# Patient Record
Sex: FEMALE | Race: OTHER | HISPANIC OR LATINO
[De-identification: names, ages, dates, MRNs, and addresses within clinical notes are randomized per-mention and may not be internally consistent; named-entity substitution may affect disease eponyms.]

---

## 2017-05-04 ENCOUNTER — RESULT REVIEW (OUTPATIENT)
Age: 47
End: 2017-05-04

## 2017-09-08 ENCOUNTER — MEDICATION RENEWAL (OUTPATIENT)
Age: 47
End: 2017-09-08

## 2017-09-12 ENCOUNTER — MEDICATION RENEWAL (OUTPATIENT)
Age: 47
End: 2017-09-12

## 2017-09-21 ENCOUNTER — MEDICATION RENEWAL (OUTPATIENT)
Age: 47
End: 2017-09-21

## 2018-08-01 ENCOUNTER — CLINICAL ADVICE (OUTPATIENT)
Age: 48
End: 2018-08-01

## 2018-08-09 ENCOUNTER — APPOINTMENT (OUTPATIENT)
Dept: ENDOCRINOLOGY | Facility: CLINIC | Age: 48
End: 2018-08-09
Payer: COMMERCIAL

## 2018-08-09 VITALS
OXYGEN SATURATION: 100 % | BODY MASS INDEX: 20.31 KG/M2 | HEART RATE: 84 BPM | TEMPERATURE: 98 F | HEIGHT: 68 IN | DIASTOLIC BLOOD PRESSURE: 94 MMHG | SYSTOLIC BLOOD PRESSURE: 164 MMHG | WEIGHT: 134 LBS

## 2018-08-09 DIAGNOSIS — Z82.49 FAMILY HISTORY OF ISCHEMIC HEART DISEASE AND OTHER DISEASES OF THE CIRCULATORY SYSTEM: ICD-10-CM

## 2018-08-09 DIAGNOSIS — Z92.89 PERSONAL HISTORY OF OTHER MEDICAL TREATMENT: ICD-10-CM

## 2018-08-09 DIAGNOSIS — Z83.49 FAMILY HISTORY OF OTHER ENDOCRINE, NUTRITIONAL AND METABOLIC DISEASES: ICD-10-CM

## 2018-08-09 DIAGNOSIS — I73.00 RAYNAUD'S SYNDROME W/OUT GANGRENE: ICD-10-CM

## 2018-08-09 PROCEDURE — 99214 OFFICE O/P EST MOD 30 MIN: CPT

## 2018-08-09 RX ORDER — LEVOTHYROXINE SODIUM 175 UG/1
175 TABLET ORAL
Qty: 80 | Refills: 3 | Status: DISCONTINUED | COMMUNITY
Start: 2017-09-08 | End: 2018-08-09

## 2018-08-11 PROBLEM — Z92.89 HISTORY OF PLASMAPHERESIS: Status: RESOLVED | Noted: 2018-08-11 | Resolved: 2018-08-11

## 2018-08-11 PROBLEM — I73.00 RAYNAUD'S SYNDROME: Status: ACTIVE | Noted: 2018-08-11

## 2018-08-11 PROBLEM — Z82.49 FAMILY HISTORY OF HYPERTENSION: Status: ACTIVE | Noted: 2018-08-11

## 2018-09-25 ENCOUNTER — EMERGENCY (EMERGENCY)
Facility: HOSPITAL | Age: 48
LOS: 1 days | Discharge: HOME | End: 2018-09-25
Attending: EMERGENCY MEDICINE | Admitting: EMERGENCY MEDICINE
Payer: COMMERCIAL

## 2018-09-25 VITALS
HEART RATE: 60 BPM | SYSTOLIC BLOOD PRESSURE: 159 MMHG | TEMPERATURE: 98 F | DIASTOLIC BLOOD PRESSURE: 90 MMHG | OXYGEN SATURATION: 100 % | RESPIRATION RATE: 18 BRPM

## 2018-09-25 VITALS
WEIGHT: 130.07 LBS | HEIGHT: 68 IN | OXYGEN SATURATION: 100 % | TEMPERATURE: 98 F | SYSTOLIC BLOOD PRESSURE: 162 MMHG | DIASTOLIC BLOOD PRESSURE: 101 MMHG | HEART RATE: 75 BPM | RESPIRATION RATE: 16 BRPM

## 2018-09-25 LAB
ALBUMIN SERPL ELPH-MCNC: 3.6 G/DL — SIGNIFICANT CHANGE UP (ref 3.3–5)
ALP SERPL-CCNC: 24 U/L — LOW (ref 40–120)
ALT FLD-CCNC: 18 U/L DA — SIGNIFICANT CHANGE UP (ref 10–45)
ANION GAP SERPL CALC-SCNC: 7 MMOL/L — SIGNIFICANT CHANGE UP (ref 5–17)
APPEARANCE UR: CLEAR — SIGNIFICANT CHANGE UP
APTT BLD: 33.3 SEC — SIGNIFICANT CHANGE UP (ref 27.5–37.4)
AST SERPL-CCNC: 26 U/L — SIGNIFICANT CHANGE UP (ref 10–40)
BASOPHILS # BLD AUTO: 0 K/UL — SIGNIFICANT CHANGE UP (ref 0–0.2)
BASOPHILS NFR BLD AUTO: 1 % — SIGNIFICANT CHANGE UP (ref 0–2)
BILIRUB SERPL-MCNC: 0.2 MG/DL — SIGNIFICANT CHANGE UP (ref 0.2–1.2)
BILIRUB UR-MCNC: NEGATIVE — SIGNIFICANT CHANGE UP
BLD GP AB SCN SERPL QL: SIGNIFICANT CHANGE UP
BUN SERPL-MCNC: 8 MG/DL — SIGNIFICANT CHANGE UP (ref 7–23)
CALCIUM SERPL-MCNC: 9.3 MG/DL — SIGNIFICANT CHANGE UP (ref 8.4–10.5)
CHLORIDE SERPL-SCNC: 104 MMOL/L — SIGNIFICANT CHANGE UP (ref 96–108)
CO2 SERPL-SCNC: 29 MMOL/L — SIGNIFICANT CHANGE UP (ref 22–31)
COLOR SPEC: YELLOW — SIGNIFICANT CHANGE UP
CREAT SERPL-MCNC: 0.82 MG/DL — SIGNIFICANT CHANGE UP (ref 0.5–1.3)
DIFF PNL FLD: NEGATIVE — SIGNIFICANT CHANGE UP
EOSINOPHIL # BLD AUTO: 0.1 K/UL — SIGNIFICANT CHANGE UP (ref 0–0.5)
EOSINOPHIL NFR BLD AUTO: 1.5 % — SIGNIFICANT CHANGE UP (ref 0–6)
GLUCOSE SERPL-MCNC: 96 MG/DL — SIGNIFICANT CHANGE UP (ref 70–99)
GLUCOSE UR QL: NEGATIVE — SIGNIFICANT CHANGE UP
HCG SERPL-ACNC: <1 MIU/ML — SIGNIFICANT CHANGE UP
HCT VFR BLD CALC: 39.7 % — SIGNIFICANT CHANGE UP (ref 34.5–45)
HGB BLD-MCNC: 12.8 G/DL — SIGNIFICANT CHANGE UP (ref 11.5–15.5)
INR BLD: 1.07 RATIO — SIGNIFICANT CHANGE UP (ref 0.88–1.16)
KETONES UR-MCNC: NEGATIVE — SIGNIFICANT CHANGE UP
LEUKOCYTE ESTERASE UR-ACNC: NEGATIVE — SIGNIFICANT CHANGE UP
LIDOCAIN IGE QN: 74 U/L — SIGNIFICANT CHANGE UP (ref 73–393)
LYMPHOCYTES # BLD AUTO: 1 K/UL — SIGNIFICANT CHANGE UP (ref 1–3.3)
LYMPHOCYTES # BLD AUTO: 23.2 % — SIGNIFICANT CHANGE UP (ref 13–44)
MCHC RBC-ENTMCNC: 27.5 PG — SIGNIFICANT CHANGE UP (ref 27–34)
MCHC RBC-ENTMCNC: 32.3 GM/DL — SIGNIFICANT CHANGE UP (ref 32–36)
MCV RBC AUTO: 85.1 FL — SIGNIFICANT CHANGE UP (ref 80–100)
MONOCYTES # BLD AUTO: 0.3 K/UL — SIGNIFICANT CHANGE UP (ref 0–0.9)
MONOCYTES NFR BLD AUTO: 7.3 % — SIGNIFICANT CHANGE UP (ref 2–14)
NEUTROPHILS # BLD AUTO: 2.8 K/UL — SIGNIFICANT CHANGE UP (ref 1.8–7.4)
NEUTROPHILS NFR BLD AUTO: 67 % — SIGNIFICANT CHANGE UP (ref 43–77)
NITRITE UR-MCNC: NEGATIVE — SIGNIFICANT CHANGE UP
PH UR: 8 — SIGNIFICANT CHANGE UP (ref 5–8)
PLATELET # BLD AUTO: 180 K/UL — SIGNIFICANT CHANGE UP (ref 150–400)
POTASSIUM SERPL-MCNC: 4 MMOL/L — SIGNIFICANT CHANGE UP (ref 3.5–5.3)
POTASSIUM SERPL-SCNC: 4 MMOL/L — SIGNIFICANT CHANGE UP (ref 3.5–5.3)
PROT SERPL-MCNC: 7.9 G/DL — SIGNIFICANT CHANGE UP (ref 6–8.3)
PROT UR-MCNC: NEGATIVE — SIGNIFICANT CHANGE UP
PROTHROM AB SERPL-ACNC: 11.9 SEC — SIGNIFICANT CHANGE UP (ref 9.8–12.7)
RBC # BLD: 4.67 M/UL — SIGNIFICANT CHANGE UP (ref 3.8–5.2)
RBC # FLD: 14.7 % — HIGH (ref 10.3–14.5)
SODIUM SERPL-SCNC: 140 MMOL/L — SIGNIFICANT CHANGE UP (ref 135–145)
SP GR SPEC: 1.01 — SIGNIFICANT CHANGE UP (ref 1.01–1.02)
UROBILINOGEN FLD QL: NEGATIVE — SIGNIFICANT CHANGE UP
WBC # BLD: 4.2 K/UL — SIGNIFICANT CHANGE UP (ref 3.8–10.5)
WBC # FLD AUTO: 4.2 K/UL — SIGNIFICANT CHANGE UP (ref 3.8–10.5)

## 2018-09-25 PROCEDURE — 84702 CHORIONIC GONADOTROPIN TEST: CPT

## 2018-09-25 PROCEDURE — 74177 CT ABD & PELVIS W/CONTRAST: CPT | Mod: 26

## 2018-09-25 PROCEDURE — 85730 THROMBOPLASTIN TIME PARTIAL: CPT

## 2018-09-25 PROCEDURE — 86901 BLOOD TYPING SEROLOGIC RH(D): CPT

## 2018-09-25 PROCEDURE — 74177 CT ABD & PELVIS W/CONTRAST: CPT

## 2018-09-25 PROCEDURE — 99284 EMERGENCY DEPT VISIT MOD MDM: CPT

## 2018-09-25 PROCEDURE — 80053 COMPREHEN METABOLIC PANEL: CPT

## 2018-09-25 PROCEDURE — 85610 PROTHROMBIN TIME: CPT

## 2018-09-25 PROCEDURE — 85027 COMPLETE CBC AUTOMATED: CPT

## 2018-09-25 PROCEDURE — 99284 EMERGENCY DEPT VISIT MOD MDM: CPT | Mod: 25

## 2018-09-25 PROCEDURE — 96374 THER/PROPH/DIAG INJ IV PUSH: CPT | Mod: XU

## 2018-09-25 PROCEDURE — 86900 BLOOD TYPING SEROLOGIC ABO: CPT

## 2018-09-25 PROCEDURE — 86850 RBC ANTIBODY SCREEN: CPT

## 2018-09-25 PROCEDURE — 83690 ASSAY OF LIPASE: CPT

## 2018-09-25 RX ORDER — ONDANSETRON 8 MG/1
4 TABLET, FILM COATED ORAL ONCE
Qty: 0 | Refills: 0 | Status: COMPLETED | OUTPATIENT
Start: 2018-09-25 | End: 2018-09-25

## 2018-09-25 RX ORDER — NITROFURANTOIN MACROCRYSTAL 50 MG
100 CAPSULE ORAL ONCE
Qty: 0 | Refills: 0 | Status: COMPLETED | OUTPATIENT
Start: 2018-09-25 | End: 2018-09-25

## 2018-09-25 RX ADMIN — ONDANSETRON 4 MILLIGRAM(S): 8 TABLET, FILM COATED ORAL at 17:44

## 2018-09-25 RX ADMIN — Medication 100 MILLIGRAM(S): at 20:17

## 2018-09-25 NOTE — ED PROVIDER NOTE - SHIFT CHANGE DETAILS
F/U CT ; Patient signed out to incoming physician.  All decisions regarding the progression of care will be made at their discretion.

## 2018-09-25 NOTE — ED PROVIDER NOTE - PHYSICAL EXAMINATION
+ RLQ tenderness,  neg psoas sign and neg obturator's sign  pelvic:  Chaperoned by BROOKS Rodriguez,   no external lesions or rash  no vaginal bleeding , + mild white mucoid discharge,  no uterine or  adnexa tenderness bilat  no cervix visualized or palpated,  pt stated she had ablation?

## 2018-09-25 NOTE — ED PROVIDER NOTE - ATTENDING CONTRIBUTION TO CARE
Hans with DIMITRIS Hinson. Patient with abd tenderness to the RLQ.  exam performed by DIMITRIS Lu. Patient without assoc findings. Likely UTI as there is dysuria as well as suprapubic tenderness. Sent by Urgent Care with the expectation to CT to r/o Appy. Pt affirms that "this feels different" from her usual UTI (the pain). I performed a face to face bedside interview with patient regarding history of present illness, review of symptoms and past medical history. I completed an independent physical exam.  I have discussed the patient's plan of care with Physician Assistant (PA). I agree with note as stated above, having amended the EMR as needed to reflect my findings.   This includes History of Present Illness, HIV, Past Medical/Surgical/Family/Social History, Allergies and Home Medications, Review of Systems, Physical Exam, and any Progress Notes during the time I functioned as the attending physician for this patient.

## 2018-09-25 NOTE — ED PROVIDER NOTE - MEDICAL DECISION MAKING DETAILS
46 yo F with  urinary symptoms suggestive of UTI, abx already prescribed by     RLQ tenderness,   d/w Dr Lakhani, will evaluated for appendicitis,   ordered for IV, urine,  GI labs,   CT scan/abd

## 2018-09-25 NOTE — ED PROVIDER NOTE - PROGRESS NOTE DETAILS
DIMITRIS Lu:  Ct scan  results d/w Dr Wilson,  no acute appendicitis,    + appendolith, risk of appendicitis, can d/c home DIMITRIS Lu: prior to d/c,  pt and pt's  informed of results and pt has no appendicitis but should f/u with pcp or her GI for further evaluation who can refer her to  general sx,  clear abd pain instructions given to pt who verbalized understanding DIMITRIS Lu: prior to d/c,  pt and pt's  informed of results and pt has no appendicitis but should f/u with pcp or her GI for further evaluation who can refer her to  general sx,  clear abd pain instructions given to pt who verbalized understanding, copy of results given to pt

## 2018-09-25 NOTE — ED PROVIDER NOTE - OBJECTIVE STATEMENT
48 yo F with cystic fibrosis,  CIDP(  chronic inflammatory demyelinating polyneuropathy  on IVIG, no HTN, presents with 1-2 weeks of urinary frequency,  no dysuria or hematuria,   + lower abd pain,  but today lower abd pain and urinary frequency worsening.   pt went to [facility] UC and  u/a: hematuria,   d/c rx with macrobid and fluconazole ( empirical tx for  candida) for UTI but  on exam,  had RLQ tenderness and advised to go to ER for evaluation of appendicitis    pt denies any h/o renal stones, pyelonephritis,  abd sx,  no vaginal bleeding, no abnormal vaginal discharge  pt c/o  feeling weak

## 2018-09-25 NOTE — ED ADULT TRIAGE NOTE - CHIEF COMPLAINT QUOTE
I have abdominal pain and am nauseous. I was at urgent care and they thought I had a kidney stone or my appendix is a  problem

## 2018-10-18 PROBLEM — E84.9 CYSTIC FIBROSIS, UNSPECIFIED: Chronic | Status: ACTIVE | Noted: 2018-09-25

## 2018-11-09 ENCOUNTER — APPOINTMENT (OUTPATIENT)
Dept: DERMATOLOGY | Facility: CLINIC | Age: 48
End: 2018-11-09
Payer: COMMERCIAL

## 2018-11-09 VITALS — BODY MASS INDEX: 20.31 KG/M2 | HEIGHT: 68 IN | WEIGHT: 134 LBS

## 2018-11-09 DIAGNOSIS — L80 VITILIGO: ICD-10-CM

## 2018-11-09 DIAGNOSIS — Z84.0 FAMILY HISTORY OF DISEASES OF THE SKIN AND SUBCUTANEOUS TISSUE: ICD-10-CM

## 2018-11-09 PROCEDURE — 99203 OFFICE O/P NEW LOW 30 MIN: CPT

## 2019-01-14 ENCOUNTER — CLINICAL ADVICE (OUTPATIENT)
Age: 49
End: 2019-01-14

## 2019-02-07 ENCOUNTER — RX RENEWAL (OUTPATIENT)
Age: 49
End: 2019-02-07

## 2019-02-26 ENCOUNTER — RESULT REVIEW (OUTPATIENT)
Age: 49
End: 2019-02-26

## 2019-04-25 ENCOUNTER — CLINICAL ADVICE (OUTPATIENT)
Age: 49
End: 2019-04-25

## 2019-04-30 ENCOUNTER — MEDICATION RENEWAL (OUTPATIENT)
Age: 49
End: 2019-04-30

## 2019-05-02 ENCOUNTER — CLINICAL ADVICE (OUTPATIENT)
Age: 49
End: 2019-05-02

## 2019-08-05 ENCOUNTER — APPOINTMENT (OUTPATIENT)
Dept: ENDOCRINOLOGY | Facility: CLINIC | Age: 49
End: 2019-08-05
Payer: COMMERCIAL

## 2019-08-05 VITALS
OXYGEN SATURATION: 100 % | HEART RATE: 81 BPM | HEIGHT: 68 IN | SYSTOLIC BLOOD PRESSURE: 147 MMHG | BODY MASS INDEX: 20.31 KG/M2 | WEIGHT: 134 LBS | DIASTOLIC BLOOD PRESSURE: 82 MMHG

## 2019-08-05 DIAGNOSIS — Z82.0 FAMILY HISTORY OF EPILEPSY AND OTHER DISEASES OF THE NERVOUS SYSTEM: ICD-10-CM

## 2019-08-05 PROCEDURE — 99214 OFFICE O/P EST MOD 30 MIN: CPT

## 2019-08-05 RX ORDER — IMMUNE GLOBULIN INFUSION (HUMAN) 100 MG/ML
20 INJECTION, SOLUTION INTRAVENOUS; SUBCUTANEOUS
Refills: 0 | Status: DISCONTINUED | COMMUNITY
Start: 2018-08-12 | End: 2019-08-05

## 2019-08-05 NOTE — DATA REVIEWED
[FreeTextEntry1] : [facility]  (7/22/19)\par \par Free T4 1.27 ng/dl  (0.82-1.77)\par TSH 1.76 uU/ml  (0.45-4.5)\par 25-D level 51.5 ng/ml\par \par Bone densitometry  (Dec, 2018--done by Gyn)\par \par T-scores:\par \par LS spine   -0.3\par L hip          -0.8\par L femoral neck  -1.0

## 2019-08-05 NOTE — REVIEW OF SYSTEMS
[Fatigue] : no fatigue [Decreased Appetite] : appetite not decreased [Recent Weight Gain (___ Lbs)] : no recent weight gain [Recent Weight Loss (___ Lbs)] : no recent weight loss [Fever] : no fever [Chills] : no chills [Blurry Vision] : no blurred vision [Dry Eyes] : no dryness of the eyes [Eyes Itch] : no itching of the eyes [Dysphagia] : no dysphagia [Hearing Loss] : no hearing loss  [Chest Pain] : no chest pain [Leg Claudication] : no intermittent leg claudication [Lower Ext Edema] : no lower extremity edema [Shortness Of Breath] : no shortness of breath [Wheezing] : no wheezing was heard [SOB on Exertion] : no shortness of breath during exertion [Nausea] : no nausea [Constipation] : no constipation [Diarrhea] : no diarrhea [Heartburn] : no heartburn [Polyuria] : no polyuria [Dysuria] : no dysuria [Muscle Cramps] : no muscle cramps [Myalgia] : no myalgia  [Hirsutism] : no hirsutism [Hair Loss] : no hair loss [Dry Skin] : no dry skin [Headache] : no headaches [Tremors] : no tremors [Difficulty Walking] : no difficulty walking [Depression] : no depression [Anxiety] : no anxiety [Insomnia] : no insomnia [Stress] : no stress [Polydipsia] : no polydipsia [Cold Intolerance] : cold tolerant [Heat Intolerance] : heat tolerant [Easy Bleeding] : no ~M tendency for easy bleeding [Easy Bruising] : no tendency for easy bruising [FreeTextEntry5] : Infrequent attacks of palpitatiions [FreeTextEntry6] : Minimal cough except after her morning chest PT [FreeTextEntry8] : Nocturia once a night [FreeTextEntry9] : Intermittent pain in the MP joint of her left first toe [de-identified] : Vitiligo continues to progress, gavino on her arms [de-identified] : See HPI re: neuropathic symptoms

## 2019-08-05 NOTE — ASSESSMENT
[FreeTextEntry1] : 1) Hypothyroidism: TSH level is in the optimal range of 0.4-2.5 uU/ml\par --Continue Synthroid 137 mcg/day\par \par 2) Vitamin D deficiency:  25-D level is excellent on her current regimen.\par --Continue 5000 units once a week\par \par 3) Hypertension:  The acute rise in her BP during IVIG infusions is presumably due to the high Na content of the solution (plus the extra IV hydration she was being given).  This problem appears to have been solved by the Lasix, and she also no longer has any ongoing edema between infusions.\par Her BP today is mildly elevated, however, and she admits that she has not been checking BPs between IVIG infusions.\par --To continue using Lasix on the day of and the day after IVIG.\par --To begin monitoring BPs daily between infusions, varying the time of day when she checks.  Also suggested that she keep a log of the readings.  Target would be < 130-140/80-85, ideally < 130/80.  She is to call me if readings are elevated.  Will have her follow a low-salt diet as the first step, but then likely add medication.  \par \par Pt to repeat labwork in November:  Fasting CMP,  A1c (DM screening), TFTs, 25-D, cortisol level (to screen for adrenal suppression given her combined nasal and inhaled steroids)\par Extra requisition for CMP, TFTs, 25-D  (approx March)\par \par See for follow-up in 1 year.  CMP, TFTs, 25-D  before the visit--but will see earlier if BP is a problem. [FreeTextEntry2] : BP targets

## 2019-08-05 NOTE — REASON FOR VISIT
[Follow-Up: _____] : a [unfilled] follow-up visit [FreeTextEntry1] : hypothyroidism and vitamin D deficiency

## 2019-08-05 NOTE — PHYSICAL EXAM
[Alert] : alert [Healthy Appearance] : healthy appearance [Well Nourished] : well nourished [PERRL] : pupils equal, round and reactive to light [Normal Sclera/Conjunctiva] : normal sclera/conjunctiva [EOMI] : extra ocular movement intact [No Lid Lag] : no lid lag [No Proptosis] : no proptosis [Normal Hearing] : hearing was normal [Normal Outer Ear/Nose] : the ears and nose were normal in appearance [No LAD] : no lymphadenopathy [No Neck Mass] : no neck mass was observed [Clear to Auscultation] : lungs were clear to auscultation bilaterally [Regular Rhythm] : with a regular rhythm [Normal Rate] : heart rate was normal  [Carotids Normal] : carotid pulses were normal with no bruits [No Edema] : there was no peripheral edema [Not Tender] : non-tender [No HSM] : no hepato-splenomegaly [Soft] : abdomen soft [Normal] : normal and non tender [No CVA Tenderness] : no ~M costovertebral angle tenderness [No Joint Swelling] : no joint swelling seen [Normal Gait] : normal gait [Normal Strength/Tone] : muscle strength and tone were normal [No Rash] : no rash [No Involuntary Movements] : no involuntary movements were seen [No Tremors] : no tremors [Normal Affect] : the affect was normal [Normal Sensation on Monofilament Testing] : normal sensation on monofilament testing of lower extremities [Normal Mood] : the mood was normal [Kyphosis] : no kyphosis present [Foot Ulcers] : no foot ulcers [Hirsutism] : no hirsutism [Acanthosis Nigricans] : no acanthosis nigricans [de-identified] : No corneal arcus or xanthelasma [de-identified] : Thyroid non-palpable [de-identified] : Soft mid-systolic murmur at the apex, audible only with pt supine [de-identified] : DP pulses 2+ ont he right, 1+ on the left [de-identified] : No cervical or supraclavicular adenopathy [de-identified] : Large patches of vitiligo on her arms and lower legs [de-identified] : Vibratory sensation mildly decreased over the toes

## 2019-08-05 NOTE — HISTORY OF PRESENT ILLNESS
[FreeTextEntry1] : 48-year-old woman who is followed for hypothyroidism and vitamin D deficiency.  Her other significant active medical problems are cystic fibrosis (diagnosed in adulthood, with primarily pulmonary involvement) and CIDP (treated previously with plasmapheresis, more recently with IVIG).  Her hypothyroidism and CIDP are accompanied by a number of other autoimmune illnesses which include vitiligo, celiac disease and Raynauds syndrome.  Her family history is positive for lupus (in her mother), vasculitis (also in her mother), hypothyroidism (in her sister and son), Graves' disease (in her brother), type I DM (in her daughter), MS (in her daughter) and idiopathic urticaria (in her son).\par \par She now returns after a hiatus of a year.  Interim history since her last visit:\par --Has continued on low-dose IVIG (now up to 25 grams every 2 weeks), and has had significant improvement in her neuropathy.  She still has burning-type neuropathic pain in her feet, but her muscle strength is nearly normal.  Her WBC counts have remained stable in the 3185-8362 range.\par --Has not had any respiratory exacerbations from her CF\par --Developed problems with edema and hypertension in April.  (Blood pressure was particularly high during IVIG infusions).  Was started on Lasix, and now takes 20 mg 1 hour before the IVIG infusion, and the day after the infusion.  Her BPs now remain normal during the infusion (though she urinates every 30 minutes), and she has no edema between infusions.  (She has not been checking BPs between infusions).\par --Was hospitalized at [facility] a few months ago with abdominal pain, found to have colitis (not C. diff) on CT scan\par Medications reviewed:\par --She continues on both inhaled and nasal steroids\par --Did not start the Lexapro, mostly because of the potential interaction with Tramadol, which she takes at night to control neuropathic pain during sleep.  \par --Is taking the Synthroid consistently and correctly\par --Still taking 5000 units of vitamin D once a week\par Exercise--Does an intensive Pilates class once a week, DVDs most of the other days.  Will occasionally run outdoors\par Has not had menstrual bleeding since her endometrial ablation 3-4 years ago.  Has no vasomotor symptoms\par Will be seeing Dr. Penaloza for Neuro follow-up within the next few weeks.

## 2019-09-04 ENCOUNTER — MEDICATION RENEWAL (OUTPATIENT)
Age: 49
End: 2019-09-04

## 2019-10-21 ENCOUNTER — CLINICAL ADVICE (OUTPATIENT)
Age: 49
End: 2019-10-21

## 2020-03-11 NOTE — ED ADULT NURSE NOTE - CADM POA PRESS ULCER
Left message on valid voicemail with results for patient.  Okay for reception staff to relay information.     No

## 2020-07-28 ENCOUNTER — RX RENEWAL (OUTPATIENT)
Age: 50
End: 2020-07-28

## 2020-07-30 ENCOUNTER — RESULT REVIEW (OUTPATIENT)
Age: 50
End: 2020-07-30

## 2020-08-03 ENCOUNTER — APPOINTMENT (OUTPATIENT)
Dept: ENDOCRINOLOGY | Facility: CLINIC | Age: 50
End: 2020-08-03
Payer: COMMERCIAL

## 2020-08-03 PROCEDURE — 99214 OFFICE O/P EST MOD 30 MIN: CPT | Mod: 95

## 2020-08-03 RX ORDER — LEVOTHYROXINE SODIUM 137 UG/1
137 TABLET ORAL
Qty: 30 | Refills: 11 | Status: DISCONTINUED | COMMUNITY
Start: 2018-08-09 | End: 2020-08-03

## 2020-08-03 RX ORDER — ESCITALOPRAM OXALATE 5 MG/1
5 TABLET ORAL
Qty: 30 | Refills: 11 | Status: DISCONTINUED | COMMUNITY
Start: 2019-04-30 | End: 2020-08-03

## 2020-08-08 NOTE — HISTORY OF PRESENT ILLNESS
[FreeTextEntry1] : 49-year-old woman who is followed for hypothyroidism and vitamin D deficiency.  Her other significant active medical problems are cystic fibrosis (diagnosed in adulthood, with primarily pulmonary involvement) and CIDP (treated previously with plasmapheresis, more recently with IVIG).  Her hypothyroidism and CIDP are accompanied by a number of other autoimmune illnesses which include vitiligo, celiac disease and Raynauds syndrome.  Her family history is positive for lupus (in her mother), vasculitis (also in her mother), hypothyroidism (in her sister and son), Graves' disease (in her brother), type I DM (in her daughter), MS (in her daughter) and idiopathic urticaria (in her son).  \par \par Due to the current COVID pandemic and the pt's high-risk status (cystic fibrosis), today's visit was carried out by [facility].  She understands that this is still an official office visit for which a bill will be submitted, and gave verbal consent.\par \par Interim events since her visit a year ago were discussed:\par --Cystic Fibrosis: Was changed from Symdeko to Trikafta at the beginning of the year.  Developed headaches and diarrhea on the drug, but stayed with it until she began to also develop intermittent R-sided abdominal pain--usually at night after supper.  GB disease was considered, but ultrasound and CT scans were negative for cholelithiasis.  A HIDA scan apparently showed markedly delayed GB emptying, however, and the Trikafta was changed back to Symdeko because of concern that this might be the cause of the problem.  She has not had any episodes of pain since the drugs were changed.\par --Began having severe night sweats (plus sweats during the day) a few months ago.  Was concerned that she might be menopausal (since she is 49 years old) and saw her Gyn.  She was told that her ovarian U/S did not show the changes expected with menopause, but her FSH level (just resulted during our visit) was in fact well into the menopausal range at 75 miU/ml.  (She has not had any menses since her uterine ablation 10 years ago).\par --CIDP:  Has continued on IVIG, now 25 grams every 2 weeks.  Her sensory symptoms (burning pain and paresthesias) have been relatively quiescent over the past few months.  She does not have any obvious muscle weakness except for the residual weakness in both 5th fingers.  She continues to exercise on a near-daily basis--cardio plus resistance work\par --The "bladder spasms" which troubled her last fall (and were considered as possibly due to interstitial cystitis) resolved after accupuncture therapy (tibial nerve stimulation).\par --She continues on brand Synthroid (137 mcg/day), takes it consistently and at least 30 min before breakfast.  Had a TSH level of 2.55 uU/ml in February.\par --Has stopped modafinil and Lexapro.  (The modafinil was definitely causing a rise in her BP)\par --BPs are otherwise under control except for a mild rise during and after IVIG infusions--but the rise in her BP (and the post-IVIG fluid retention) are largely prevented by furosemide doses before the infusion is started\par Weight and diet are about the same:\par --Breakfast is usually a Smoothie made with LF yogurt, spinach, banana, pineapple and coconut water.  Puts half-and-half in her coffee (because she needs to have a certain amount of fat with the meal in order to optimize absorption of the Symdeko)\par --Lunch is usually a sandwich (tuna or turkey)\par --Protein at supper is beef once a week, otherwise poultry\par --Has one piece of chocolate a day, but her intake of baked goods is minimal.\par Vitamin D supplementation is now 5000 units 3X/week

## 2020-08-08 NOTE — ASSESSMENT
[FreeTextEntry1] : 1) Hypothyroidism:  TSH level is now mildly elevated despite the pt's excellent compliance with her regimen and her relatively stable Synthroid dose for over a year.  The reason for this is unclear--possibly a loss of residual endogenous thyroid reserve, possibly an effect of the Symdeko. \par --To increase the Synthroid to 150 mcg/day\par \par 2) Hypercholesterolemia:  LDL-cholesterol is moderately above the optimal level of 100 mg%, but still at the pt's technical target of 130 mg%.  Will need to watch for a rise in her LDL with menopause.  Her diet leaves little room for additional modification.  She clearly does not warrant statin therapy.\par --Diet was reinforced\par \par 3) Hypertension:  Now appears to be a problem only during IVIG infusions, and seems to respond to "prophylactic" doses of furosemide.\par --Pt to continue the furosemide and regular BP monitoring \par \par 4) Menopause:  Her FSH level is well into the menopausal range, so her episodic sweating appears to represent typical menopausal vasomotor symptoms.  If these continue to be as disruptive of her sleep as they have during the past few months, HRT (possibly for a year) may be warranted.  She will discuss this with her gynecologist.\par --Continue bone densitometry studies every 2 years.\par --Continue calcium and vit D supplementation\par \par 5) Vitamin D deficiency:  25-D level is excellent.\par --Continue supplementation with 5000 units 3 days/week\par \par To repeat TFTs in 2 months on the increased dose of Synthroid.  Will discuss results over the phone.\par \par Will send the pt requisitions for TFTs and for TFTs/25-D level\par \par Duration of encounter 30 min [FreeTextEntry2] : Diet, LDL-cholesterol targets, possible HRT

## 2020-08-08 NOTE — REVIEW OF SYSTEMS
[Fatigue] : no fatigue [Decreased Appetite] : appetite not decreased [Recent Weight Gain (___ Lbs)] : no recent weight gain [Recent Weight Loss (___ Lbs)] : no recent weight loss [Fever] : no fever [Blurred Vision] : no blurred vision [Chills] : no chills [Dry Eyes] : no dryness [Dysphagia] : no dysphagia [Eyes Itch] : no itch [Palpitations] : no palpitations [Chest Pain] : no chest pain [Hearing Loss] : no hearing loss  [Shortness Of Breath] : no shortness of breath [Wheezing] : no wheezing [Nausea] : no nausea [SOB on Exertion] : no shortness of breath on exertion [Heartburn] : no heartburn [Constipation] : no constipation [Polyuria] : no polyuria [Dysuria] : no dysuria [Joint Pain] : no joint pain [Myalgia] : no myalgia  [Joint Stiffness] : no joint stiffness [Dry Skin] : dry skin [Muscle Cramps] : no muscle cramps [Hair Loss] : no hair loss [Ulcer] : no ulcer [Headaches] : no headaches [Difficulty Walking] : no difficulty walking [Pain/Numbness of Digits] : pain/numbness of digits [Depression] : no depression [Tremors] : no tremors [Stress] : no stress [Polydipsia] : no polydipsia [Cold Intolerance] : no cold intolerance [Heat Intolerance] : heat intolerance [Swelling] : no swelling [Hot Flashes] : hot flashes [Easy Bleeding] : no ~M tendency for easy bleeding [FreeTextEntry6] : Cough only during respiratory therapy sessions [FreeTextEntry5] : Mild edema for 1-2 days after IVIG infusions [FreeTextEntry8] : Nocturia once a night [FreeTextEntry9] : Still with weakness of the 5th fingers of both hands [FreeTextEntry7] : Bowel movements still somewhat loose on the Symdeko [de-identified] : Sleep is interrupted by her night sweats

## 2020-08-08 NOTE — DATA REVIEWED
[FreeTextEntry1] : [facility]  (7/23/2020)\par \par FBS 93, CMP WNL\par , , TG 56\par Free T4 1.5 ng/dl  (0.82-1.77)\par TSH 5.03 juU/ml  (0.45-4.5)\par 25-D 43.5 ng/ml\par

## 2020-08-15 ENCOUNTER — OUTPATIENT (OUTPATIENT)
Dept: OUTPATIENT SERVICES | Facility: HOSPITAL | Age: 50
LOS: 1 days | Discharge: HOME | End: 2020-08-15

## 2020-08-15 DIAGNOSIS — D64.9 ANEMIA, UNSPECIFIED: ICD-10-CM

## 2020-08-20 ENCOUNTER — APPOINTMENT (OUTPATIENT)
Dept: HEMATOLOGY ONCOLOGY | Facility: CLINIC | Age: 50
End: 2020-08-20
Payer: COMMERCIAL

## 2020-08-20 DIAGNOSIS — Z86.2 PERSONAL HISTORY OF DISEASES OF THE BLOOD AND BLOOD-FORMING ORGANS AND CERTAIN DISORDERS INVOLVING THE IMMUNE MECHANISM: ICD-10-CM

## 2020-08-20 DIAGNOSIS — Z86.79 PERSONAL HISTORY OF OTHER DISEASES OF THE CIRCULATORY SYSTEM: ICD-10-CM

## 2020-08-20 DIAGNOSIS — R14.0 ABDOMINAL DISTENSION (GASEOUS): ICD-10-CM

## 2020-08-20 DIAGNOSIS — Z87.898 PERSONAL HISTORY OF OTHER SPECIFIED CONDITIONS: ICD-10-CM

## 2020-08-20 PROCEDURE — 99215 OFFICE O/P EST HI 40 MIN: CPT | Mod: 95

## 2020-08-20 RX ORDER — DORNASE ALFA 1 MG/ML
1 SOLUTION RESPIRATORY (INHALATION)
Refills: 0 | Status: DISCONTINUED | COMMUNITY
Start: 2018-08-12 | End: 2020-08-20

## 2020-09-16 NOTE — REASON FOR VISIT
[Follow-Up Visit] : a follow-up visit for [FreeTextEntry2] : CIDP on long-term immunoglobulin therapy which has associated thrombotic risk, to discuss addition of hormone replacement therapy.

## 2020-09-16 NOTE — HISTORY OF PRESENT ILLNESS
[Home] : at home, [unfilled] , at the time of the visit. [Medical Office: ([facility])___] : at the medical office located in  [Verbal consent obtained from patient] : the patient, [unfilled] [de-identified] : 49 year old female with cystic fibrosis, CIDP previously on plasmapheresis with albumin replacement, Hashimoto's thyroiditis, vitiligo. Post initiation of plasmapheresis she was documented to have iron deficiency anemia and treated intermittently with IV iron replacement(Venofer).  [de-identified] : Since office visit on 4/28/14, she continues plasmapheresis for CIDP 5X/month at [facility]. They monitor her CBC and Iron studies. \par She intermittently receives IV Venofer replacement, no adverse reaction. She receives treatment through [facility].\par She was hospitalized at [facility] 1 month ago for severe viral infection with fever and CF exacerbation, completed 4 weeks on Unasyn(completed at home).\par Lab studies on 8/16/16 reveal Hgb- 10.2, Iron- 34, transferrin saturation- 12%, Ferritin- 8 ng/mL. She describes chronic moderate-severe fatigue, no shortness of breath, dizziness.  Patient received IV iron replacement therapy as indicated. \par \par Patient had last plasmapheresis for CIDP at [facility] on 11/17/16. \par Hgb- 13.0, Hct- 39.4%, MCV- 88.9, Iron- 109, transferrin sat- 36%, Ferritin- 36. \par She will begin IVIG at home after 12/7/16, ordered by Neurology.\par Labs(1/27/17)- Hgb- 10.6 g/dL, Hct- 32.9%, platelets- 227K, WBC- 3.2, 56% neutrophils.\par (1/30/17)- Iron- 19 mcg/dL, Transferrin saturation- 4%, Ferritin- 13 ng/mL. Weight- 128 lbs, Ht- 68 inches\par She is currently receiving IVIG for several weeks with daily phlebotomy. She c/o significant fatigue, and some FLYNN( she has cystic fibrosis), but no chest pain or dizziness.\par 1) Iron deficiency anemia-\par Plan- Venofer 200 mg IV every other day X 5 doses.([facility])\par Repeat CBC and Iron studies three weeks after last Venofer infusion.\par \par Today, she informs me that she does not have iron deficiency anemia since stopping  plasmapheresis and last IV iron in Feb 2017. \par \par Patient has been receiving Gammagard S/D 25 grams for CIDP since about Jan 2017, every 2 weeks, and more recently every 3 weeks since COVID-19 pandemic. She denies history of thromboembolic events and there is no family history of thrombotic events < 60 years of age. She has been evaluated by her gynecologist for severe menopausal symptoms and discussion about starting Climara-Pro Transdermal Patch took place. \par

## 2020-09-16 NOTE — CONSULT LETTER
[Consult Letter:] : I had the pleasure of evaluating your patient, [unfilled]. [Dear  ___] : Dear  [unfilled], [Sincerely,] : Sincerely, [Please see my note below.] : Please see my note below. [Consult Closing:] : Thank you very much for allowing me to participate in the care of this patient.  If you have any questions, please do not hesitate to contact me. [FreeTextEntry2] : Romeo Fernández M.D\par [address] [place] [address], [address]\par [place]   [address]\par  [FreeTextEntry3] : Belle Miller M.D.\par \par  of Medicine\par [employer] [employer]\par [facility]\par [facility] [place]\par [address] [place] [address]\par [place]   [address]\par Phone: [phone]\par Fax: [phone]

## 2020-09-16 NOTE — ASSESSMENT
Your stress test is negative, however if your symptoms do not improve in one week we will need to schedule a follow up appointment with cardiology, the staff will call to get you scheduled and you can cancel if you have had resolution of this pain     [FreeTextEntry1] : 1) Patient with cystic fibrosis, and CIDP- \par She has been receiving Gammagard S/D 25 grams for CIDP since about 2017, every 2 weeks, and more recently every 3 weeks since COVID-19 pandemic. She denies history of thromboembolic events and there is no family history of thrombotic events < 60 years of age. She has been evaluated by her gynecologist for severe menopausal symptoms and discussion about starting Climara-Pro Transdermal Patch took place. The patient is referred for hematology evaluation regarding the use of hormone replacement therapy(HRT) in setting of long-term immunoglobulin therapy which has associated thrombotic risk. \par \par I discussed with the patient that immunoglobulin therapy is associated with risk of thrombosis which may occur with or without known risk factors, including estrogen use. Review of data on Climara-Pro Transdermal Patch shows RR of 1.95- 2.13 for DVT and PE with use of Patch versus placebo. I recommend exhausting a trial of all alternative non-hormonal treatment options for menopausal symptoms, prior to consideration of HRT with the knowledge of increased risk of thrombotic events on concurrent therapy, which will require therapeutic anticoagulation if thrombosis occurs. Today, the patient informs me that she has scheduled second opinion Gynecology consult at [facility] regarding other treatment options. \par \par I confirmed patient's name and  at beginning of Telehealth visit. Verbal consent for Telehealth visit given on 20 at 3:33 pm by taylor Foster. Visit start time- 3:30 pm- Visit end time- 4:16 pm. Total visit time- 46 minutes.

## 2020-09-16 NOTE — REVIEW OF SYSTEMS
[Easy Bruising] : a tendency for easy bruising [Fever] : no fever [Chills] : no chills [Nosebleeds] : no nosebleeds [Chest Pain] : no chest pain [Mucosal Pain] : no mucosal pain [Shortness Of Breath] : no shortness of breath [Lower Ext Edema] : no lower extremity edema [Vomiting] : no vomiting [Dizziness] : no dizziness [Skin Rash] : no skin rash [Fainting] : no fainting [FreeTextEntry2] : chronic moderate fatigue [FreeTextEntry7] : good appetite, no nausea, occasional abdominal pain; occasional diarrhea versus constipation [FreeTextEntry6] : chronic cough with CF [FreeTextEntry9] : no bone pain

## 2020-10-22 ENCOUNTER — APPOINTMENT (OUTPATIENT)
Dept: ENDOCRINOLOGY | Facility: CLINIC | Age: 50
End: 2020-10-22
Payer: COMMERCIAL

## 2020-10-22 PROCEDURE — 99214 OFFICE O/P EST MOD 30 MIN: CPT | Mod: 25,95

## 2020-10-25 NOTE — REVIEW OF SYSTEMS
[Cough] : cough [Fatigue] : no fatigue [Decreased Appetite] : appetite not decreased [Recent Weight Gain (___ Lbs)] : no recent weight gain [Recent Weight Loss (___ Lbs)] : no recent weight loss [Fever] : no fever [Chills] : no chills [Dry Eyes] : no dryness [Eyes Itch] : no itch [Dysphagia] : no dysphagia [Hearing Loss] : no hearing loss  [Chest Pain] : no chest pain [Palpitations] : no palpitations [Lower Ext Edema] : no lower extremity edema [Shortness Of Breath] : no shortness of breath [Wheezing] : no wheezing [Nausea] : no nausea [Abdominal Pain] : no abdominal pain [Polyuria] : no polyuria [Dysuria] : no dysuria [Joint Pain] : no joint pain [Muscle Weakness] : no muscle weakness [Myalgia] : no myalgia  [Joint Stiffness] : no joint stiffness [Dry Skin] : no dry skin [Hair Loss] : no hair loss [Headaches] : no headaches [Difficulty Walking] : no difficulty walking [Tremors] : no tremors [Anxiety] : no anxiety [Stress] : no stress [Polydipsia] : no polydipsia [Cold Intolerance] : no cold intolerance [Heat Intolerance] : no heat intolerance [Easy Bleeding] : no ~M tendency for easy bleeding [Easy Bruising] : no tendency for easy bruising [FreeTextEntry3] : Has been noting more problems with near-vision [FreeTextEntry6] : Has no SOB with normal exertion and minimal dyspnea even during cardio exercise [FreeTextEntry7] : Reflux symptoms are under control with daily Nexium, but recur if she tries to stop the drug.  Bowel movements are still intermittently loose, but the diarrhea which she had with Trikafta has remitted [FreeTextEntry8] : Nocturia only if she is already awakened by something else.  Her painful bladder spasms have resolved [de-identified] : See comments in the HPI re: neuropathic symptoms in her feet [de-identified] : Mild dysphoria secondary to COVID restrictions [de-identified] : Hot flashes still present but markedly improved on HRT

## 2020-10-25 NOTE — HISTORY OF PRESENT ILLNESS
[FreeTextEntry1] : 49-year-old woman who is followed for hypothyroidism and vitamin D deficiency.  Her other significant active medical problems are cystic fibrosis (diagnosed in adulthood, with primarily pulmonary involvement) and CIDP (treated previously with plasmapheresis, more recently with IVIG).  Her hypothyroidism and CIDP are accompanied by a number of other autoimmune illnesses which include vitiligo and Raynauds syndrome.  Her family history is positive for lupus (in her mother), vasculitis (also in her mother), hypothyroidism (in her sister and son), Graves' disease (in her brother), type I DM (in her daughter), MS (in her daughter) and idiopathic urticaria (in her son).  \par \par Due to the current COVID pandemic and the pt's high-risk status (cystic fibrosis), today's visit was carried out by TeleHealth video.  At the time of the encounter, the pt was located at her home ([address] [place] [address]) and I was in my office at [address] [place] [address].  The pt understands that this is still an official office visit for which a bill will be submitted, and gave verbal consent.\par \par Interim events since her last visit:\par --Had further work-up for her GB hypokinesis.  Repeat HIDA scan showed an even lower EF of 2%, and cholecystectomy has been recommended.  Although she does not have stones and has not had any post-prandial pain since approximately July, the surgery is apparently being recommended because of the increased risk of cholecystitis from the biliary stasis.  The surgery will likely be done at [facility].\par --She has seen a new gynecologist regarding her disabling menopausal vasomotor symptoms, and has been started on HRT with an estradiol patch plus oral progesterone.  (The pt questioned the necessity for the progesterone given her previous uterine ablation, but I emphasized that there is still almost certainly at least some endometrial tissue present).\par --She continues to receive IVIG every 2 weeks and thinks that her neuropathy symptoms have improved significantly.  She has essentially no muscle weakness.  The neuropathic symptoms in her feet (mostly tingling-type paresthesias) are occurring mostly at night, and are adequately controlled with a single bedtime dose of tramadol. \par --She remains on Symdeko for her CF.\par --She has already seen the results of her recent bloodwork, and is concerned about the elevated LDL-cholesterol.  She is not aware of hyperlipidemia in either of her parents (though her mother  quite young and had multiple other illnesses which might have masked elevated lipids)\par \par Medications were reviewed:  The only changes are the addition of the estradiol patch and progesterone.  She has been taking the Synthroid consistently and on an empty stomach.  She has continued taking 5000 units of vitamin D 3 days/week.\par She has no distinct thyroid-related symptoms.\par Given her hyperlipidemia, diet was reviewed:\par --Is still limiting her fat intake (because of her gall bladder problem) and also avoiding gluten as much as possible\par --Breakfast is usually oatmeal\par --Lunch is either a salad or leftovers from the night before\par --Will occasionally use a protein shake (made with soy milk) as an alternative breakfast or lunch\par --Snacks will be either nuts or hummus (with carrots)\par --Protein at supper is chicken or fish.  Starch is most often sweet potato,\par --Intake of baked goods or frozen desserts is negligible\par --Cheese intake is limited to parmesan cheese on pasta\par \par Exercises for 50-60 min every day, usually after her chest PT--15 min on a treadmill plus calisthenics, etc  (Has had to limit her treadmill workouts due to recent problems with piriformis syndrome).

## 2020-10-25 NOTE — ASSESSMENT
[FreeTextEntry1] : 1) Hypothyroidism:  TSH level is in the optimal range of 0.4-2.5 uU/ml.\par --Continue Synthroid 150 mcg/day\par \par 2) Hyperlipidemia:  LDL-cholesterol is above her "technical" target of 130 mg%, and well above the optimal level of < 100 mg%.  It seems to have risen over the past year, quite possibly as a result of the menopausal decrease in her endogenous estrogens.  Her diet is excellent at this point, and the current LDL elevation does suggest a genetic component to the hyperlipidemia.  \par --Given the possibility that the rise in her LDL is due to menopause, will see if her lipid profile improves on the HRT.\par --Her current LDL is still not high enough to warrant statin therapy \par \par 3) Vitamin D deficiency:  25-D level is in an excellent range, and her supplementation requirements (averaging 2000 units/day) are not particularly high\par --Continue vitamin D 5000 units 3X/week\par \par Repeat TFTs, 25-D and lipids in 3 months--will discuss over the phone\par See for follow-up in 6 months.  Same bloodwork as above\par \par Duration of encounter  30 min [FreeTextEntry2] : Diet, LDL targets

## 2020-10-25 NOTE — DATA REVIEWED
[FreeTextEntry1] : [facility]  (10/19/20)\par \par TSH 0.84 uU/ml  (0.45-4.5)\par 25-D level excellent at 45.7 ng/ml\par , HDL 89, TG 68

## 2021-01-28 ENCOUNTER — NON-APPOINTMENT (OUTPATIENT)
Age: 51
End: 2021-01-28

## 2021-01-28 RX ORDER — ESTRADIOL 0.04 MG/D
0.04 PATCH, EXTENDED RELEASE TRANSDERMAL
Refills: 0 | Status: DISCONTINUED | COMMUNITY
Start: 2020-10-22 | End: 2021-01-28

## 2021-02-10 ENCOUNTER — APPOINTMENT (OUTPATIENT)
Dept: ENDOCRINOLOGY | Facility: CLINIC | Age: 51
End: 2021-02-10

## 2021-04-29 ENCOUNTER — APPOINTMENT (OUTPATIENT)
Dept: ENDOCRINOLOGY | Facility: CLINIC | Age: 51
End: 2021-04-29
Payer: COMMERCIAL

## 2021-04-29 VITALS
HEART RATE: 77 BPM | DIASTOLIC BLOOD PRESSURE: 77 MMHG | TEMPERATURE: 98 F | SYSTOLIC BLOOD PRESSURE: 129 MMHG | OXYGEN SATURATION: 100 % | BODY MASS INDEX: 21.44 KG/M2 | WEIGHT: 141 LBS

## 2021-04-29 DIAGNOSIS — K82.8 OTHER SPECIFIED DISEASES OF GALLBLADDER: ICD-10-CM

## 2021-04-29 PROCEDURE — 99072 ADDL SUPL MATRL&STAF TM PHE: CPT

## 2021-04-29 PROCEDURE — 99214 OFFICE O/P EST MOD 30 MIN: CPT

## 2021-04-30 PROBLEM — K82.8 BILIARY DYSKINESIA: Status: RESOLVED | Noted: 2020-10-22 | Resolved: 2021-04-30

## 2021-05-02 NOTE — ASSESSMENT
[FreeTextEntry1] : 1) Hypothyroidism: TSH level is in the optimal range of 0.4-2.5 uU/ml\par --Continue Synthroid 150 mcg 6 days/week, 75 mcg (1/2 tablet) 1 day/week\par \par 2) Hyperlipidemia:  LDL-cholesterol level is above the optimal of 100 mg% despite an excellent diet.  She would not ordinarily be a candidate for statin therapy, but this may need to be reconsidered given the moderately elevated Lp(a).  For now, will continue diet only.\par \par 3) Post-cholecystectomy diarrhea:  Appears to have completely resolved.\par --D/C cholestyramine\par \par 4) GERD:  Pt has been on Nexium continuously for many years.  Has had a return of symptoms whenever she has tried to interrupt therapy, but has not had a trial of a standing dose of an H-2 blocker.\par --Suggested that she try switching to a standing dose of famotidine 20 mg/day--either directly or by alternating doses of the Nexium and Pepcid for a few weeks to observe the response. \par \par Repeat lipids, TFTs, 25-D in 4 months--discuss over the phone\par CMP, lipids, TFTs and 25-D in 8 months--discuss over the phone\par See for follow-up in 1 year.  CMP, lipids, TFTs and 25-D before the visit [FreeTextEntry2] : Diet, lipid targets, risks of long-term PPI therapy

## 2021-05-02 NOTE — DATA REVIEWED
[FreeTextEntry1] : [facility]  (4/26/21)\par \par CMP WNL\par , HDL 93, TG 44\par TSH level 1.09 uU/ml  (0.45-4.5)\par 25-D level is excellent at 50.5 ng/ml\par \par Lp(a) level 127.5  (normal < 75)\par \par Echocardiogram:  Mild MR, EF 68%

## 2021-05-02 NOTE — PHYSICAL EXAM
[Alert] : alert [Well Nourished] : well nourished [Healthy Appearance] : healthy appearance [Normal Sclera/Conjunctiva] : normal sclera/conjunctiva [EOMI] : extra ocular movement intact [PERRL] : pupils equal, round and reactive to light [No Proptosis] : no proptosis [No Lid Lag] : no lid lag [Normal Outer Ear/Nose] : the ears and nose were normal in appearance [Normal Hearing] : hearing was normal [No Neck Mass] : no neck mass was observed [No LAD] : no lymphadenopathy [Clear to Auscultation] : lungs were clear to auscultation bilaterally [No Murmurs] : no murmurs [Normal Rate] : heart rate was normal [Regular Rhythm] : with a regular rhythm [Carotids Normal] : carotid pulses were normal with no bruits [No Edema] : no peripheral edema [Soft] : abdomen soft [Not Tender] : non-tender [No HSM] : no hepato-splenomegaly [Normal Supraclavicular Nodes] : no supraclavicular lymphadenopathy [Normal Anterior Cervical Nodes] : no anterior cervical lymphadenopathy [No CVA Tenderness] : no ~M costovertebral angle tenderness [Normal Gait] : normal gait [No Involuntary Movements] : no involuntary movements were seen [No Joint Swelling] : no joint swelling seen [Normal Strength/Tone] : muscle strength and tone were normal [No Tremors] : no tremors [Normal Sensation on Monofilament Testing] : normal sensation on monofilament testing of lower extremities [Normal Mood] : the mood was normal [Normal Affect] : the affect was normal [Kyphosis] : no kyphosis present [Acanthosis Nigricans] : no acanthosis nigricans [Foot Ulcers] : no foot ulcers [Hirsutism] : no hirsutism [de-identified] : No corneal arcus or xanthelasma [de-identified] : Thyroid non-palpable [de-identified] : Excellent air entry bilaterally.  No rhonchi or wheezing [de-identified] : Quadriceps and hip flexor strength 5/5 bilaterally [de-identified] : DP pulses only faintly palpable bilaterally, but capillary refill is brisk [de-identified] : Large areas of vitiligo over both lower legs and both forearms [de-identified] : Vibratory sensation moderately decreased over the toes

## 2021-05-02 NOTE — REVIEW OF SYSTEMS
[Pain/Numbness of Digits] : pain/numbness of digits [Heat Intolerance] : heat intolerance [Fatigue] : no fatigue [Decreased Appetite] : appetite not decreased [Fever] : no fever [Chills] : no chills [Dry Eyes] : no dryness [Blurred Vision] : no blurred vision [Eyes Itch] : no itch [Dysphagia] : no dysphagia [Hearing Loss] : no hearing loss  [Chest Pain] : no chest pain [Palpitations] : no palpitations [Lower Ext Edema] : no lower extremity edema [Shortness Of Breath] : no shortness of breath [Wheezing] : no wheezing [SOB on Exertion] : no shortness of breath on exertion [Nausea] : no nausea [Constipation] : no constipation [Diarrhea] : no diarrhea [Polyuria] : no polyuria [Dysuria] : no dysuria [Nocturia] : no nocturia [Joint Pain] : no joint pain [Muscle Weakness] : no muscle weakness [Myalgia] : no myalgia  [Joint Stiffness] : no joint stiffness [Dry Skin] : no dry skin [Hirsutism] : no hirsutism [Ulcer] : no ulcer [Difficulty Walking] : no difficulty walking [Tremors] : no tremors [Depression] : no depression [Anxiety] : no anxiety [Stress] : no stress [Polydipsia] : no polydipsia [Cold Intolerance] : no cold intolerance [Easy Bleeding] : no ~M tendency for easy bleeding [Easy Bruising] : no tendency for easy bruising [FreeTextEntry2] : Has gained 7 lb since her last in-office visit in 2019 [FreeTextEntry6] : Cough only with her respiratory therapy [FreeTextEntry7] : Heartburn has been eliminated by daily Nexium.  Bowel pattern has been normal despite stopping the cholestyramine. [FreeTextEntry9] : See HPI re: symptoms from the R iliopsoas strain  [de-identified] : Vitiligo continues to progress.   [de-identified] : Migraines are now once a month.

## 2021-05-02 NOTE — HISTORY OF PRESENT ILLNESS
[FreeTextEntry1] : 50-year-old woman who is followed for hypothyroidism and vitamin D deficiency.  Her other significant active medical problems are cystic fibrosis (diagnosed in adulthood, with primarily pulmonary involvement) and CIDP (treated previously with plasmapheresis, more recently with IVIG).  Her hypothyroidism and CIDP are accompanied by a number of other autoimmune illnesses which include vitiligo and Raynauds syndrome.  Her family history is positive for lupus (in her mother), vasculitis (also in her mother), hypothyroidism (in her sister and son), Graves' disease (in her brother), type I DM (in her daughter), MS (in her daughter) and idiopathic urticaria (in her son).  \par \par She now returns after a hiatus of two years--(her visit last year was during the COVID peak and was done remotely).\par Interim history since her last visit:\par --Has not seen a new neurologist since Dr. Penaloza retired.  Will be seeing Dr. Kwong next week.  Is still on IVIG but at a very low dose (25 gm q2 wk).  Has nonetheless had a good response.\par --Received both doses of (Pfizer) COVID vaccine.  Had myalgias for several hours after the second injection, but no fever.\par --Has been using infrared therapy for the neuropathic symptoms in her feet with good results.\par --Has been having more migraines recently (sometimes provoked by even a little alcohol).  Migraines are often accompanied by olfactory hallucinations (smells smoke)\par --Hot flashes are under control with the estrogen patch\par --Had a lap joel in November of last year.  Developed post-cholecystectomy diarrhea, was started on a small dose of cholestyramine, but no longer needs it.\par --Had a steroid injection last week for the iliopsoas strain in her R leg.\par Current diet:\par --Breakfast is oatmeal with almond milk and berries\par --Lunch is a salad with some type of added protein (chicken, salmon)\par --Protein at supper is chicken or fish. Starch is white rice, squash, or sweet potatoes.\par --Intake of baked goods is negligible\par Is still using brand Synthroid--taking it consistently and correctly.\par Vitamin D supplementation is now 5000 units 3 days/week.\par

## 2021-07-20 ENCOUNTER — RX RENEWAL (OUTPATIENT)
Age: 51
End: 2021-07-20

## 2021-08-26 ENCOUNTER — NON-APPOINTMENT (OUTPATIENT)
Age: 51
End: 2021-08-26

## 2022-01-26 ENCOUNTER — NON-APPOINTMENT (OUTPATIENT)
Age: 52
End: 2022-01-26

## 2022-03-09 ENCOUNTER — NON-APPOINTMENT (OUTPATIENT)
Age: 52
End: 2022-03-09

## 2022-06-15 ENCOUNTER — APPOINTMENT (OUTPATIENT)
Dept: ENDOCRINOLOGY | Facility: CLINIC | Age: 52
End: 2022-06-15
Payer: COMMERCIAL

## 2022-06-15 VITALS
RESPIRATION RATE: 16 BRPM | TEMPERATURE: 97.6 F | BODY MASS INDEX: 20.76 KG/M2 | HEART RATE: 81 BPM | HEIGHT: 68 IN | WEIGHT: 137 LBS | DIASTOLIC BLOOD PRESSURE: 97 MMHG | SYSTOLIC BLOOD PRESSURE: 162 MMHG | OXYGEN SATURATION: 100 %

## 2022-06-15 PROCEDURE — 99214 OFFICE O/P EST MOD 30 MIN: CPT

## 2022-06-15 RX ORDER — ROSUVASTATIN CALCIUM 5 MG/1
5 TABLET, FILM COATED ORAL
Refills: 0 | Status: DISCONTINUED | COMMUNITY
Start: 2022-03-09 | End: 2022-06-15

## 2022-06-15 RX ORDER — LORAZEPAM 0.5 MG/1
0.5 TABLET ORAL
Qty: 30 | Refills: 0 | Status: DISCONTINUED | COMMUNITY
Start: 2020-06-23 | End: 2022-06-15

## 2022-06-19 NOTE — REVIEW OF SYSTEMS
[Pain/Numbness of Digits] : pain/numbness of digits [Heat Intolerance] : heat intolerance [Fatigue] : no fatigue [Decreased Appetite] : appetite not decreased [Fever] : no fever [Chills] : no chills [Dry Eyes] : no dryness [Blurred Vision] : no blurred vision [Eyes Itch] : no itch [Dysphagia] : no dysphagia [Hearing Loss] : no hearing loss  [Chest Pain] : no chest pain [Palpitations] : no palpitations [Lower Ext Edema] : no lower extremity edema [Shortness Of Breath] : no shortness of breath [Wheezing] : no wheezing [SOB on Exertion] : no shortness of breath on exertion [Nausea] : no nausea [Constipation] : no constipation [Heartburn] : no heartburn [Diarrhea] : no diarrhea [Polyuria] : no polyuria [Dysuria] : no dysuria [Muscle Weakness] : no muscle weakness [Myalgia] : no myalgia  [Dry Skin] : no dry skin [Hirsutism] : no hirsutism [Ulcer] : no ulcer [Difficulty Walking] : no difficulty walking [Tremors] : no tremors [Depression] : no depression [Anxiety] : no anxiety [Stress] : no stress [Polydipsia] : no polydipsia [Cold Intolerance] : no cold intolerance [Easy Bleeding] : no ~M tendency for easy bleeding [Easy Bruising] : no tendency for easy bruising [FreeTextEntry2] : Has lost 4 lb since her last visit [FreeTextEntry6] : Cough only with her respiratory therapy [FreeTextEntry7] : Bowel movements are now normal with Questran 3-4X/week [FreeTextEntry8] : Occasional nocturia.  Has been having somewhat more frequent vaginal yeast infections [FreeTextEntry9] : Pain and LOM in the MP joint of her left first toe (has been diagnosed with hallus rigidus) [de-identified] : Vitiligo continues to progress.   [de-identified] : Migraines have been more frequent.   [de-identified] : Sensory symptoms in her feet are worse during the summer and if her feet are over-heated

## 2022-06-19 NOTE — PHYSICAL EXAM
[Alert] : alert [Well Nourished] : well nourished [Healthy Appearance] : healthy appearance [Normal Sclera/Conjunctiva] : normal sclera/conjunctiva [EOMI] : extra ocular movement intact [PERRL] : pupils equal, round and reactive to light [No Proptosis] : no proptosis [No Lid Lag] : no lid lag [Normal Outer Ear/Nose] : the ears and nose were normal in appearance [Normal Hearing] : hearing was normal [No Neck Mass] : no neck mass was observed [No LAD] : no lymphadenopathy [Clear to Auscultation] : lungs were clear to auscultation bilaterally [No Murmurs] : no murmurs [Normal Rate] : heart rate was normal [Regular Rhythm] : with a regular rhythm [Carotids Normal] : carotid pulses were normal with no bruits [No Edema] : no peripheral edema [Not Tender] : non-tender [Soft] : abdomen soft [No HSM] : no hepato-splenomegaly [Normal Supraclavicular Nodes] : no supraclavicular lymphadenopathy [Normal Anterior Cervical Nodes] : no anterior cervical lymphadenopathy [No CVA Tenderness] : no ~M costovertebral angle tenderness [Normal Gait] : normal gait [No Involuntary Movements] : no involuntary movements were seen [Normal Strength/Tone] : muscle strength and tone were normal [No Tremors] : no tremors [Normal Sensation on Monofilament Testing] : normal sensation on monofilament testing of lower extremities [Normal Affect] : the affect was normal [Normal Mood] : the mood was normal [Kyphosis] : no kyphosis present [Acanthosis Nigricans] : no acanthosis nigricans [Foot Ulcers] : no foot ulcers [Hirsutism] : no hirsutism [de-identified] : No corneal arcus or xanthelasma [de-identified] : Thyroid non-palpable [de-identified] : Excellent air entry bilaterally.  No rhonchi or wheezing [de-identified] : DP pulses 2+ bilaterally [de-identified] : Quadriceps and hip flexor strength 5/5 bilaterally.  Swelling of the MP joint of her left first toe with limited flexion/extension [de-identified] : Large areas of vitiligo over both lower legs, upper chest and both forearms [de-identified] : Vibratory sensation mildly decreased over the toes

## 2022-06-19 NOTE — DATA REVIEWED
[FreeTextEntry1] : [facility]  (6/10/22)\par \par FBS 93, CMP WNL\par TSH 0.662 uU/ml  (0.45-4.5)\par 25-D level well into target range at 56.5 ng/ml\par , HDL 99, TG 44

## 2022-06-19 NOTE — ASSESSMENT
[FreeTextEntry1] : 1) Hypothyroidism:  TSH level is in the optimal range of 0.4-2.5 uU/ml\par --Continue Synthroid 150 mcg 6 days/week\par \par 2) Hyperlipidemia:  Given her elevated Lp(a), LDL-cholesterol level should be brought at least below 100 mg%, ?? even 70 mg%.  Her reaction to the rosuvastatin came surprisingly quickly, but there does not appear to be any other explanation for her symptoms.  She clearly needs statin therapy, and will try pravastatin as the first alternative, then either simvastatin or Lescol if necessary.\par --Rx for pravastatin (10 mg/day) sent to the local pharmacy.  She will start at half a tablet daily for the first week, then increase to a full tablet if no side-effects\par \par 3) Elevated BP reading:  BP is quite high at today's visit.  Although this is possibly a "white-coat" exacerbation, the high diastolic (97 mm Hg) would be unusual for this, and the pt has had elevated BPs in the past--mostly after IVIG infusions.  \par --She will resume BP monitoring at home, and let me know if she is seeing values > 140/85\par \par 4) Vitamin D deficiency:  25-D level is excellent\par --Continue supplementation with 5000 units 3X/week\par \par To repeat a lipid profile and CMP after 2 months on the pravastatin--discuss results over the phone.\par See for follow-up in 6-12 months.  CMP, lipids, TFTs, 25-D before the visit [FreeTextEntry2] : Diet, LDL targets, alternative statins

## 2022-06-19 NOTE — HISTORY OF PRESENT ILLNESS
[FreeTextEntry1] : 51-year-old woman who is followed for hypothyroidism, dyslipidemia and vitamin D deficiency.  Her other significant medical problems are cystic fibrosis (diagnosed in adulthood, with primarily pulmonary involvement) and CIDP (treated previously with plasmapheresis, more recently with IVIG).  Her hypothyroidism and CIDP are accompanied by a number of other autoimmune illnesses which include vitiligo and Raynauds syndrome.  Her hypercholesterolemia is only mild in degree, but is accompanied by an elevated Lp(a) level. Her family history is positive for lupus (in her mother), vasculitis (also in her mother), hypothyroidism (in her sister and son), Graves' disease (in her brother), type I DM (in her daughter), MS (in her daughter) and idiopathic urticaria (in her son).  \par \par She now returns after a hiatus of a little over a year.\mini Has changed her neurologist at [facility] to Dr. Hager, and has seen her for one visit.  She continues on IVIG, but at a dose of 25 gm every 2 weeks.  She will occasionally have mild leg weakness as the effect of the previous dose begins to wear off.  The sensory symptoms in her lower legs and feet (mostly paresthesias and burning pain) do not completely remit after IVIG infusions.  \mini Has had one COVID booster so far.  Had a flare of Herpes labialis and some myalgias after the booster.  \par Migraines have been worse.  She is not on any prophylactic meds at this point.\mini Started on Crestor for her hypercholesterolemia, but developed myalgias in her legs after one day and stopped it.\par Medications reviewed:  \par --Is taking Synthroid consistently and at least 30 min before breakfast\par --Is still on HRT (Estradiol patch) plus progesterone.\par --Has needed to restart the cholestyramine for the post-cholecystectomy diarrhea, but takes only 1/2 pkg 3-4X/week\par Exercise is a daily 2-mile exercise walk plus calisthenics.  Also does some interval workouts on an elliptical a few times a week.\mini Has not been taking any BP checks at home.\par Diet reviewed:\par --Breakfast is overnight oats with luan seeds and berries\par --Lunch is either leftovers or a salad with some type of added protein (usually tuna or salmon)\par --Protein at supper is beans or fish.  Eating no beef, and very little chicken.  Starch is sweet potatoes or vegetable-based pastas, -\par --Intake of baked goods is negligible

## 2022-07-07 ENCOUNTER — NON-APPOINTMENT (OUTPATIENT)
Age: 52
End: 2022-07-07

## 2022-07-18 ENCOUNTER — TRANSCRIPTION ENCOUNTER (OUTPATIENT)
Age: 52
End: 2022-07-18

## 2022-07-20 ENCOUNTER — NON-APPOINTMENT (OUTPATIENT)
Age: 52
End: 2022-07-20

## 2022-07-21 ENCOUNTER — APPOINTMENT (OUTPATIENT)
Dept: DISASTER EMERGENCY | Facility: HOSPITAL | Age: 52
End: 2022-07-21

## 2022-07-27 ENCOUNTER — NON-APPOINTMENT (OUTPATIENT)
Age: 52
End: 2022-07-27

## 2022-08-10 ENCOUNTER — APPOINTMENT (OUTPATIENT)
Dept: PULMONOLOGY | Facility: CLINIC | Age: 52
End: 2022-08-10

## 2022-08-10 VITALS
HEIGHT: 68 IN | WEIGHT: 132 LBS | OXYGEN SATURATION: 96 % | BODY MASS INDEX: 20 KG/M2 | HEART RATE: 76 BPM | RESPIRATION RATE: 18 BRPM

## 2022-08-10 PROCEDURE — 99215 OFFICE O/P EST HI 40 MIN: CPT | Mod: 95

## 2022-08-10 RX ORDER — LEVOTHYROXINE SODIUM 150 UG/1
150 TABLET ORAL
Qty: 90 | Refills: 0 | Status: DISCONTINUED | COMMUNITY
Start: 2020-08-03 | End: 2022-08-10

## 2022-08-10 RX ORDER — TRAMADOL HYDROCHLORIDE 50 MG/1
50 TABLET, COATED ORAL 4 TIMES DAILY
Refills: 0 | Status: DISCONTINUED | COMMUNITY
Start: 2018-08-12 | End: 2022-08-10

## 2022-08-10 RX ORDER — TRIAMCINOLONE ACETONIDE 55 UG/1
55 SOLUTION/ DROPS OPHTHALMIC
Refills: 0 | Status: DISCONTINUED | COMMUNITY
Start: 2018-08-12 | End: 2022-08-10

## 2022-08-10 NOTE — HISTORY OF PRESENT ILLNESS
[Never] : never [Difficulty Breathing During Exertion] : dyspnea on exertion [Feelings Of Weakness On Exertion] : exercise intolerance [Cough] : coughing [Wheezing] : wheezing [Nasal Passage Blockage (Stuffiness)] : edema [Nonspecific Pain, Swelling, And Stiffness] : chest pain [Fever] : fever [Wt Gain ___ kg] : No recent weight gain [Wt Loss ___ kg] : No recent weight loss [TextBox_4] : Irena is a 51 year old  Female with mild Cystic Fibrosis (S376wpp/L206W) (FEV1 84%) on Symdeko after intolerant of Trikafta.(severe hormonal fluctuations). \par \par  Respiratory colonizations with MSSA and E. Coli. Comorbidities include: Vitiligo, CIDP (on IVIG q 2 wks), IBS, GERD, Raynaud's syndrome, Hypothyroidism, seasonal allergic rhinitis, recurrent candida vaginosis, dyslipidemia, post cholecystectomy syndrome, and anxiety/depression and panic attacks. \par \par She experienced a Covid illness last month along with her  and teen age child characterized by: sore throat, congestion and headache, chills and sweats. () She was set up for Monoclonal antibody therapy but was improving by the time the infusion could be scheduled and cancelled. She has been improving but not fully baseline. She also experienced a flare of bilateral sciatica during her Covid recovery treated with Advil and slowly resolved. She is still experiencing mild brain fog, sputum approaching clear/sticky baseline but breathing effort/ease of breathing not fully back to baseline. Minimal cough, <1/4 cup sputum per day, no wheeze, chest pain or hemoptysis. Daily ACT with Vest and nebs.\par \par ZephyRx home spirometry done last Friday is at her baseline mid 80"s. She is experiencing mild post nasal drip despite morning nasal steroid spray and plans to add pm dosing.\par \par Reflux is controlled mostly with chronic PPI.\par Post cholecystectomy therapy with Questran stable.\par \par Today she is most concerned with mental health issues finding herself anxious and with frequent palpitations and panic attacks. She finds it difficult to place limits on helping family when she needs alone/personal time and feels guilty when she does. She had used low dose Lexapro during stressful times several years ago, has lorazepam at home we had prescribed but is now . She has been talking with our mental health coordinator about strategies and possible outside counseling. She is not experiencing any suicidal ideation. She has a good support system with other CF patients and her sibling who has CF for community of understanding and finds this helpful. Making time for self care has been challenging. \par \par Patient seeing a Neurologist/Headache specialist and recent trial of Botox not well tolerated. Plan on one additional use and several other preventative medications not tolerated and will be removed from medication list. \par \par Has need for foot /toe surgery and not yet arranged.Has to weak clogs and avoid excursions of R great toe or severe pain. Has" Hallus Rigidus"\par \par Patient with frequent Candida vaginitis and requiring frequent courses of Fluconazole. Aware of Symdeko dosing alterations during courses and would like to explore etiology and preventative strategies. Plans for f/u conversation in next few days with us to put plan in place.

## 2022-08-10 NOTE — DISCUSSION/SUMMARY
[FreeTextEntry1] : Mild CF on modulator: Symdeko recovering over last several weeks from recent COVID.\par Continue present Airway Clearance: Vest/Nebs\par Interval ZephyRx Spirometry pre Q 4 CF visit \par f/u by phone tomorrow and Q 4 care in (Oct-Dec) anticipate in person \par Obtain sputum for colonizations with next in person visit. \par last 7/2021 Ecoli and candida\par \par CT chest last 7/2021 - resolution of previously noted lung nodules. \par DEXA 7/2021: normal bmd\par \par GERD: Continue PPI\par \par Anxiety/Depression: renew PRN lorazepam, add standing Clonazepam low dose bid continue to f/u with MH coordinator Julianna Marcano. Will consider restart low dose Lexapro. \par coping strategies and boundary setting discussed at length\par \par Migraine: f/u with provider and we'll renew Zofran for migraine associated nausea.

## 2022-08-10 NOTE — REASON FOR VISIT
[Home] : at home, [unfilled] , at the time of the visit. [Medical Office: ([facility])___] : at the medical office located in  [Follow-Up] : a follow-up visit [TextBox_44] :  [facility]

## 2022-08-10 NOTE — REVIEW OF SYSTEMS
[Fatigue] : fatigue [Recent Wt Loss (___ Lbs)] : ~T recent [unfilled] lb weight loss [Nasal Congestion] : nasal congestion [Postnasal Drip] : postnasal drip [Cough] : cough [Sputum] : sputum [Palpitations] : palpitations [GERD] : gerd [Arthralgias] : arthralgias [Headache] : headache [Depression] : depression [Anxiety] : anxiety [Panic Attacks] : panic attacks [Thyroid Problem] : thyroid problem [Fever] : no fever [Recent Wt Gain (___ Lbs)] : ~T no recent weight gain [Chills] : no chills [Poor Appetite] : no poor appetite [Dry Eyes] : no dry eyes [Ear Disturbance] : no ear disturbance [Epistaxis] : no epistaxis [Sore Throat] : no sore throat [Eye Irritation] : no eye irritation [Dry Mouth] : no dry mouth [Sinus Problems] : no sinus problems [Mouth Ulcers] : no mouth ulcers [Poor Dentition] : no poor dentition [Edentulous] : no edentulous [Hemoptysis] : no hemoptysis [Chest Tightness] : no chest tightness [Frequent URIs] : no frequent URIs [Dyspnea] : no dyspnea [Pleuritic Pain] : no pleuritic pain [Wheezing] : no wheezing [A.M. Dry Mouth] : no a.m. dry mouth [SOB on Exertion] : no sob on exertion [Chest Discomfort] : no chest discomfort [Claudication] : no claudication [Edema] : no edema [Leg Cramps] : no leg cramps [Orthopnea] : no orthopnea [Phlebitis] : no phlebitis [PND] : no PND [Syncope] : no syncope [Hay Fever] : no hay fever [Watery Eyes] : no watery eyes [Itchy Eyes] : no itchy eyes [Seasonal Allergies] : no seasonal allergies [Nasal Discharge] : no nasal discharge [Hives] : no hives [Angioedema] : no angioedema [Immunocompromised] : not immunocompromised [Abdominal Pain] : no abdominal pain [Nausea] : no nausea [Vomiting] : no vomiting [Diarrhea] : no diarrhea [Constipation] : no constipation [Dysphagia] : no dysphagia [Bleeding] : no bleeding [Food Intolerance] : no food intolerance [Hepatic Disease] : no hepatic disease [Dysuria] : no dysuria [Frequency] : no frequency [Trauma/ Injury] : no trauma/ injury [Chronic Pain] : no chronic pain [Rash] : no rash [Dizziness] : no dizziness [Numbness] : no numbness [Confusion] : no confusion [Diabetes] : no diabetes [Obesity] : no obesity [TextBox_30] : clear and sticky no plugs [TextBox_44] : daily palpitations. resting HR higher than her usual but 67. [TextBox_83] : no menses since ablation 2010 [TextBox_94] : knees area and hands intermittent. Has Hallux Rigidus. needs surgery [TextBox_122] : smoke smell aura pre migraine

## 2022-08-10 NOTE — PHYSICAL EXAM
[No Acute Distress] : no acute distress [Normal Oropharynx] : normal oropharynx [Normal Appearance] : normal appearance [No Neck Mass] : no neck mass [Normal Rate/Rhythm] : normal rate/rhythm [No Resp Distress] : no resp distress [No Acc Muscle Use] : no acc muscle use [Clear to Auscultation Bilaterally] : clear to auscultation bilaterally [No Focal Deficits] : no focal deficits [Oriented x3] : oriented x3 [Normal Affect] : normal affect [TextBox_125] : vitiligo

## 2022-08-19 RX ORDER — CLONAZEPAM 0.5 MG/1
0.5 TABLET ORAL 3 TIMES DAILY
Qty: 90 | Refills: 0 | Status: COMPLETED | COMMUNITY
Start: 2022-08-10

## 2022-08-19 RX ORDER — FLUCONAZOLE 150 MG/1
150 TABLET ORAL
Qty: 3 | Refills: 2 | Status: COMPLETED | COMMUNITY
Start: 2022-08-21

## 2022-10-12 RX ORDER — PRAVASTATIN SODIUM 10 MG/1
10 TABLET ORAL
Qty: 30 | Refills: 3 | Status: DISCONTINUED | COMMUNITY
Start: 2022-06-15 | End: 2022-10-12

## 2022-11-07 ENCOUNTER — RESULT REVIEW (OUTPATIENT)
Age: 52
End: 2022-11-07

## 2023-04-18 ENCOUNTER — APPOINTMENT (OUTPATIENT)
Dept: PULMONOLOGY | Facility: CLINIC | Age: 53
End: 2023-04-18

## 2023-04-27 ENCOUNTER — NON-APPOINTMENT (OUTPATIENT)
Age: 53
End: 2023-04-27

## 2023-05-01 RX ORDER — ALBUTEROL SULFATE 90 UG/1
108 (90 BASE) POWDER, METERED RESPIRATORY (INHALATION)
Qty: 3 | Refills: 3 | Status: DISCONTINUED | COMMUNITY
Start: 2018-08-12 | End: 2023-05-01

## 2023-05-02 ENCOUNTER — APPOINTMENT (OUTPATIENT)
Dept: PULMONOLOGY | Facility: CLINIC | Age: 53
End: 2023-05-02
Payer: COMMERCIAL

## 2023-05-02 VITALS
OXYGEN SATURATION: 99 % | HEIGHT: 68 IN | WEIGHT: 133 LBS | HEART RATE: 71 BPM | RESPIRATION RATE: 18 BRPM | BODY MASS INDEX: 20.16 KG/M2

## 2023-05-02 DIAGNOSIS — G61.81 CHRONIC INFLAMMATORY DEMYELINATING POLYNEURITIS: ICD-10-CM

## 2023-05-02 PROCEDURE — 99214 OFFICE O/P EST MOD 30 MIN: CPT | Mod: 95

## 2023-05-02 RX ORDER — ESTRADIOL 0.05 MG/D
0.05 PATCH, EXTENDED RELEASE TRANSDERMAL
Refills: 0 | Status: DISCONTINUED | COMMUNITY
End: 2023-05-02

## 2023-05-02 RX ORDER — ALBUTEROL SULFATE 90 UG/1
108 (90 BASE) INHALANT RESPIRATORY (INHALATION)
Qty: 3 | Refills: 3 | Status: DISCONTINUED | COMMUNITY
Start: 2022-03-10 | End: 2023-05-02

## 2023-05-04 RX ORDER — ESTRADIOL 0.06 MG/D
0.06 PATCH TRANSDERMAL
Refills: 0 | Status: DISCONTINUED | COMMUNITY
End: 2023-05-04

## 2023-05-12 DIAGNOSIS — B37.31 ACUTE CANDIDIASIS OF VULVA AND VAGINA: ICD-10-CM

## 2023-05-15 RX ORDER — ALPRAZOLAM 1 MG/1
1 TABLET ORAL 3 TIMES DAILY
Qty: 90 | Refills: 0 | Status: DISCONTINUED | COMMUNITY
Start: 2023-05-04 | End: 2023-05-15

## 2023-05-15 NOTE — PROCEDURE
[FreeTextEntry1] : Home [place] 5.2.23 \par GLI 2012                 Best             Pred.                 %Pred. \par \par FVC (L)                  3.33              3.92                   85% \par \par FEV1 (L)                2.53              3.11                    81% \par \par FEV1/FVC             0.76              0.80                    95% \par \par [id number] (L/s)       2.08              2.90                    72% \par \par \par \par \par \par Home Spirometry 8.5.2022 \par \par  GLI 2012                     Best             Pred.            %Pred. \par \par FVC (L)                        3.40             3.94             86% \par \par FEV1 (L)                      2.62              3.13             84% \par \par FEV1/FVC                   0.77              0.80              96% \par \par [id number] (L/s)             2.26              2.94             77% \par \par  \par \par

## 2023-05-15 NOTE — HISTORY OF PRESENT ILLNESS
[Sweat Test] : Sweat Test [Genetic Testing] : Genetic Testing [Clinical Criteria] : Clinical Criteria [Siblings with CF] : ~He/She~ has sibling(s) with CF [Age at Diagnosis: ___] : the patient is a ~age~  ~male/female~ whose age at diagnosis was [unfilled] [CF Pulmonary Exacerbation] : for CF Pulmonary Exacerbation [1  - Very slight] : 1, very slight [MSSA] : MSSA [Other: ___] : [unfilled] [Last home IV Abx: ___] : The most recent course of home IV antibiotics was [unfilled] [Occasional] : occasional [None] : ~He/She~ has no hemoptysis [] : exercise daily [Headache] : headache [Bleeding] : nasal bleeding [Date: ___] : The last HgA1C was performed on [unfilled] [HgA1C Value: ___] : HgA1C value was [unfilled]  [Oxygen] : the patient does not use supplemental oxygen [Congestion] : no nasal congestion [Sinus Pain] : no sinus pain [Nasal Polyps] : no nasal polyps [Sinus Surgery] : no history of sinus surgery [Pancreatitis] : no pancreatitis [Pancreatic Insufficiency] : no pancreatic insufficiency [Pancreatic Enzyme Supp.] : uses no pancreatic enzyme supplements [Diarrhea] : no diarrhea [Constipation] : no constipation [Steatorrhea] : no steatorrhea [Rectal Prolapse] : no history of rectal prolapse [CFRD] : no CFRD [Awaiting Transplant of ___] : the patient is not awaiting organ transplant [de-identified] : Irena is a 52 year old  Female with mild Cystic Fibrosis (P283pnd/L206W) (FEV1 84%) on Symdeko after intolerant of Trikafta.(severe hormonal fluctuations). \par \par Respiratory colonizations with MSSA and E.Coli. Comorbidities include: Vitiligo, CIDP (on IVIG q 2 wks), IBS, GERD, Raynaud's syndrome, Hypothyroidism, seasonal allergic rhinitis, recurrent candida vaginosis, dyslipidemia, post cholecystectomy syndrome, and anxiety/depression and panic attacks. Most recently underwent a procedure to ablate a very painful neck neuropathic pain preceding and post chiropractic manipulation. She had two branch blocks then a RF ablation.in Fall. Pain is not fully relieved to 4/10 pain with certain moves. C2-C5 were addressed. \par \par She experienced a Covid illness last July along with her  and teen age child characterized by: sore throat, congestion and headache, chills and sweats. (7/17) She was set up for Monoclonal antibody therapy but was improving by the time the infusion could be scheduled and cancelled.  She also experienced a flare of bilateral sciatica during her Covid recovery treated with Advil and slowly resolved. She experienced post covid mild brain fog and anxiety and palpitations. Using Klonopin for the weeks post covid and has for a prn and used when had the RF ablation when she had palpitations with the steroid injection.\par Her current symptoms include: ( + ) Cough after therapy which is intentional or cough after working out or brisk walk which is not. .Sputum is clear,  no wheeze no hemoptysis no chest pain except when one time when she experienced palpitations.,She has a normal exercise  tolerance.and does High intensity intervals, light weight training, brisk walks and Pilates all without dyspnea. .\par \par Daily ACT with Vest and nebs.Consistent with therapy.\par ZephyRx home spirometry done and stable:\par Experiences only intermittent and mild post nasal drip /sinus pain /pressure\par Reflux is controlled mostly with chronic PPI.Only symptomatic if she misses doses\par Post cholecystectomy therapy with Questran- is no longer needing to use any Questran. BM's are normal and formed.\par \par With regard to  anxiety/palpitations/panic attacks. She only had one episode since our last visit and used Clonazepam only for this as well as for samina- procedure anxiety. The prescription from last summer is nearing its end and she will need a new Clonazepam Script soon.  We carefully reviewed benzodiazepine prescriptions as Dr Ndiaye had given a prior alprazolam script before the Clonazepam was ordered. Patient indicates this medication was never used.\par \par Patient seeing a Neurologist/Headache specialist and had a trial of Botox not initially  well tolerated. Now on PRN Nyrtec.\par \par Still with Hallus Rigidus without surgical intervention which is managed by wearing soft clog.  \par \par Patient with frequent Candida vaginitis and requiring frequent courses of Fluconazole. Aware of Symdeko dosing alterations during courses and would like to explore etiology and preventative strategies. [de-identified] : albuterol NaCl with portable and [employer] home vest once daily, occasional.. manual when camping, [employer] in past. [de-identified] : Uses Nyrtec for migraines which were nearly weekly. Botox initial one not good but had others after and helped [de-identified] : Post cholecystectomy loose stool has resolved and no longer needs to use cholestyramine to modulate.

## 2023-05-15 NOTE — REVIEW OF SYSTEMS
[Feeling Poorly] : feeling poorly [Feeling Tired] : feeling tired [Nosebleeds] : nosebleeds [Nasal Discharge] : nasal discharge [Chest Pain] : chest pain [Palpitations] : palpitations [Joint Pain] : joint pain [Anxiety] : anxiety [Hot Flashes] : hot flashes [Negative] : Genitourinary [Fever] : no fever [Chills] : no chills [Recent Weight Gain (___ Lbs)] : no recent weight gain [Recent Weight Loss (___ Lbs)] : no recent weight loss [Earache] : no earache [Loss Of Hearing] : no hearing loss [Sore Throat] : no sore throat [Hoarseness] : no hoarseness [Heart Rate Is Slow] : the heart rate was not slow [Heart Rate Is Fast] : the heart rate was not fast [Leg Claudication] : no intermittent leg claudication [Lower Ext Edema] : no lower extremity edema [Skin Lesions] : no skin lesions [Skin Wound] : no skin wound [Itching] : no itching [Change In A Mole] : no change in a mole [Breast Pain] : no breast pain [Breast Lump] : no breast lump [Dizziness] : no dizziness [Fainting] : no fainting [FreeTextEntry3] : nasal congestion with allergiese worse outside [FreeTextEntry4] : vitiligo [FreeTextEntry2] : intermittent hot flashes. now on 0.75 dose of estradiol.

## 2023-05-15 NOTE — ASSESSMENT
[FreeTextEntry1] : Mild CF on modulator: Symdeko after intolerant of Trikafta related to hormonal fluctuations.\par Without present exacerbation\par Genetics T809otp/L206W Sweat chloride intermediate: (56/47)\par Continue present Airway Clearance: Vest/Nebs/ Exercise\par Nebs HS/albuterol daily\par Patient sent partial annuals from Dr [facility] desiree draw for upload to [facility]. LFT WNW\par \par Will have rest of annuals done when she takes a sputum to the lab for processing.. Sent patient requisitions to do at [facility] lab.\par \par Home Spirometry:reviewed. Out of practice and difficult to coordinate. Better FEV1 and FVC with August studies\par Obtain big box PFT later in 2023 in office.\par  \par Obtain sputum for colonizations  arrange drop to [facility] lab.and will send both requisitions and container\par Last 7/2021 E coli and candida\par \par CT Chest last 7/2021 - resolution of previously noted lung nodules. Repeat later in 2023.\par DEXA 7/2021: normal BMD\par \par Having difficulty with insurance for Symdeko and needing  and [facility] assistance to get ongoing approvals: mostly related to [place] law changes. \par \par IPOS by SW pre visit\par \par # RAD continue controller, rescue and Singulair\par \par # GERD: Continue PPI\par UGI and Colonoscopy this year. Records to be scanned to [facility] if not already in.\par \par # Anxiety/Depression: using clonazepam low dose prn continue to f/u with MH Coordinator Julianna Marcano.\par Coping strategies and boundary setting discussed at length. [facility] RX for Xanax/Alprazolam from last August never implemented. Will store med away and not use in addition to Clonazepam. Xanax removed from med list.\par \par # Migraine: f/u with provider and we'll renew Zofran for migraine associated nausea. Now on Nurtec, had Botox multiple injections.\par \par PRN Advil early on in neck pain issues caused lots of dyspepsia.\par \par # Hypothyroid Follows with Dr. Kennedy [facility] Endo\par Continue Synthroid.\par \par # Dyslipidemia related to Elevated Lp(a) \par Needs new cardiologist since her last one at [facility] retired. Will refer to Denis Finkelstein\par Continues on Statin/ F/u low grade carotid blockages.\par \par # CIDP with every other week Gammagard/IVIG\par Follows with [facility] Dr. Penaloza

## 2023-05-15 NOTE — PHYSICAL EXAM
[General Appearance - Well Developed] : well developed [Normal Appearance] : normal appearance [Well Groomed] : well groomed [General Appearance - Well Nourished] : well nourished [No Deformities] : no deformities [General Appearance - In No Acute Distress] : no acute distress [Normal Conjunctiva] : the conjunctiva exhibited no abnormalities [Neck Appearance] : the appearance of the neck was normal [Exaggerated Use Of Accessory Muscles For Inspiration] : no accessory muscle use [Abnormal Walk] : normal gait [Skin Color & Pigmentation] : normal skin color and pigmentation [] : no rash [Skin Lesions] : no skin lesions [No Focal Deficits] : no focal deficits [Oriented To Time, Place, And Person] : oriented to person, place, and time [Impaired Insight] : insight and judgment were intact [Affect] : the affect was normal [Mood] : the mood was normal [Memory Recent] : recent memory was not impaired [Memory Remote] : remote memory was not impaired [FreeTextEntry1] : unable to assess

## 2023-05-16 ENCOUNTER — NON-APPOINTMENT (OUTPATIENT)
Age: 53
End: 2023-05-16

## 2023-06-05 LAB
A-TOCOPHEROL VIT E SERPL-MCNC: 21.2 MG/L
BETA+GAMMA TOCOPHEROL SERPL-MCNC: 0.7 MG/L
GGT SERPL-CCNC: 9 U/L
TOTAL IGE SMQN RAST: 79 KU/L
VIT A SERPL-MCNC: 38.2 UG/DL

## 2023-06-06 LAB — BACTERIA SPT CF RESP CULT: ABNORMAL

## 2023-06-28 ENCOUNTER — OUTPATIENT (OUTPATIENT)
Dept: OUTPATIENT SERVICES | Facility: HOSPITAL | Age: 53
LOS: 1 days | Discharge: HOME | End: 2023-06-28

## 2023-06-28 DIAGNOSIS — D64.9 ANEMIA, UNSPECIFIED: ICD-10-CM

## 2023-07-06 LAB — FUNGUS SPT CULT: ABNORMAL

## 2023-07-07 ENCOUNTER — NON-APPOINTMENT (OUTPATIENT)
Age: 53
End: 2023-07-07

## 2023-07-11 ENCOUNTER — APPOINTMENT (OUTPATIENT)
Dept: HEMATOLOGY ONCOLOGY | Facility: CLINIC | Age: 53
End: 2023-07-11

## 2023-07-11 ENCOUNTER — LABORATORY RESULT (OUTPATIENT)
Age: 53
End: 2023-07-11

## 2023-07-12 ENCOUNTER — NON-APPOINTMENT (OUTPATIENT)
Age: 53
End: 2023-07-12

## 2023-07-12 ENCOUNTER — APPOINTMENT (OUTPATIENT)
Dept: HEART AND VASCULAR | Facility: CLINIC | Age: 53
End: 2023-07-12
Payer: COMMERCIAL

## 2023-07-12 VITALS
DIASTOLIC BLOOD PRESSURE: 93 MMHG | BODY MASS INDEX: 20.16 KG/M2 | HEART RATE: 77 BPM | WEIGHT: 133 LBS | OXYGEN SATURATION: 98 % | SYSTOLIC BLOOD PRESSURE: 150 MMHG | HEIGHT: 68 IN

## 2023-07-12 VITALS — SYSTOLIC BLOOD PRESSURE: 136 MMHG | DIASTOLIC BLOOD PRESSURE: 91 MMHG

## 2023-07-12 DIAGNOSIS — Z78.0 ASYMPTOMATIC MENOPAUSAL STATE: ICD-10-CM

## 2023-07-12 DIAGNOSIS — Z86.79 PERSONAL HISTORY OF OTHER DISEASES OF THE CIRCULATORY SYSTEM: ICD-10-CM

## 2023-07-12 DIAGNOSIS — E78.00 PURE HYPERCHOLESTEROLEMIA, UNSPECIFIED: ICD-10-CM

## 2023-07-12 DIAGNOSIS — Z87.898 PERSONAL HISTORY OF OTHER SPECIFIED CONDITIONS: ICD-10-CM

## 2023-07-12 DIAGNOSIS — K91.89 OTHER POSTPROCEDURAL COMPLICATIONS AND DISORDERS OF DIGESTIVE SYSTEM: ICD-10-CM

## 2023-07-12 DIAGNOSIS — Z91.89 OTHER SPECIFIED PERSONAL RISK FACTORS, NOT ELSEWHERE CLASSIFIED: ICD-10-CM

## 2023-07-12 DIAGNOSIS — Z87.39 PERSONAL HISTORY OF OTHER DISEASES OF THE MUSCULOSKELETAL SYSTEM AND CONNECTIVE TISSUE: ICD-10-CM

## 2023-07-12 DIAGNOSIS — R19.7 OTHER POSTPROCEDURAL COMPLICATIONS AND DISORDERS OF DIGESTIVE SYSTEM: ICD-10-CM

## 2023-07-12 DIAGNOSIS — U07.1 COVID-19: ICD-10-CM

## 2023-07-12 PROCEDURE — 93000 ELECTROCARDIOGRAM COMPLETE: CPT

## 2023-07-12 PROCEDURE — 99204 OFFICE O/P NEW MOD 45 MIN: CPT | Mod: 25

## 2023-07-12 NOTE — REASON FOR VISIT
[FreeTextEntry1] : Diagnostic Tests:\par -----------------------------------------------\par ECG:\par 07/12/23: sinus rhythm, JUDE, RBBB. \par 03/08/22: sinus rhythm, RBBB. \par -----------------------------------------------\par Echo:\par 03/08/21: EF 68%, grade I diastolic dysfunction, mild MR, mild TR. \par 04/30/18: EF 67%, grade I diastolic dysfunction, mild MR, mild TR. \par -----------------------------------------------\par Carotid arteries:\par 03/08/22: sono: mild MEKA atherosclerosis.

## 2023-07-12 NOTE — HISTORY OF PRESENT ILLNESS
[FreeTextEntry1] : Ms. Vidal presents for initial evaluation and management of cystic fibrosis, CIDP, GERD, hypothyroidism, dyslipidemia, migraine headache, cervical spine disease, and HTN.  In 2003 she experienced a syncopal episode while riding the LIR[place].  She was followed by other cardiologists, Gm Recinos MD and Stephanie Schmid MD and was noted to have an elevated lipoprotein a (127 on 03/01/21).  She has been on pravastatin but has had no history of intolerance to more potent statins.  At present, she denies chest pain and has FLYNN to 2 flights of stairs.  \par \par

## 2023-07-12 NOTE — ASSESSMENT
[FreeTextEntry1] : 1. Dyslipidemia: Lp(a) 127 (03/01/21),  (10/04/22): \par       - discussed therapeutic lifestyle changes to promote improved lipid metabolism \par       - will discontinue pravastatin 20mg po qd \par       - will initiate PCSK9-I, Repatha 140mg sc q 2 weeks to target Lp(a) (possible adverse effects of new medications discussed) \par       - check lab work today including LP(a)\par       - will consider switching to pelicarsan or olpasiran when they become clinically available \par \par 2. Borderline HTN: BP near ACC/AHA 2017 guideline target:\par       - she will maintain a home blood pressure log for my review next visit\par       - if BP remains above target next visit will initiate an anti-HTN regimen \par \par 3. Right bundle branch block: no evidence of advanced conduction disease: \par       - will send for an echocardiogram to rule out structural heart disease

## 2023-07-12 NOTE — REVIEW OF SYSTEMS
[Negative] : Heme/Lymph [Feeling Fatigued] : feeling fatigued [Dyspnea on exertion] : dyspnea during exertion [Myalgia] : myalgia

## 2023-07-12 NOTE — DISCUSSION/SUMMARY
[FreeTextEntry1] : REASON FOR CONSULT\par Irena Vidal is a 52-year-old female who was referred by Yadira Mcdaniel MS, [facility] [place] for cancer genetic counseling and risk assessment due to a variant of uncertain significance in the BRCA1 gene detected in her father and a family history of cancer. Student Nolberto Hilario was also present for the session. \par \par RELEVANT MEDICAL HISTORY\par Ms. Vidal is a healthy individual who has never had cancer. She has a family history of prostate and breast cancer, see below. \par \par OTHER MEDICAL AND SURGICAL HISTORY:\par •	Medical history: cystic fibrosis (O670ccn/L206W), vitiligo, CIDP, IBS, GERD, Raynaud’s, Hashimoto’s, seasonal allergies, dyslipidemia, anxiety, depression, panic attacks\par •	Surgical history: endometrial ablation d/t bleeding in , intravenous catheter insertion, salivary gland surgery d/t calcification in gland, cholecystectomy d/t CF symptoms, toe\par \par PAST OB/GYN HISTORY:\par Obstetrical History: \par Age at Menarche: 14\par Menopausal, age 48 after endometrial ablation\par Age at First Live Birth: 24\par Oral Contraceptive Use: None\par Hormone Replacement Therapy: 3 years ago, patch is 2x week, other daily use\par \par CANCER SCREENING HISTORY:  \par Breast:\par •	Mammogram and sonogram: annual, most recent exam reported in 2022, reported negative\par •	MRI: None\par •	Biopsy: None\par GYN:\par •	Pelvic Exam: annual, most recent exam reported in 2022\par •	Patient reported h/o abnormal bleeding requiring endometrial ablation\par •	Pap smear: reported negative hx\par Colon:\par •	Colonoscopy: 2-year frequency, most recent reported in 2022\par o	Patient reported h/o under 5 benign polyps\par •	Endoscopy: 2-year frequency, most recent reported in 2022\par o	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 A detailed family history of cancer was ascertained. Relevant diagnoses are detailed below and in the scanned pedigree. \par \par Of note, Ms. Vidal’s father had genetic testing for genes associated with prostate cancer and a variant of uncertain significance (VUS) was identified in the BRCA1 gene (BRCA1 c.4520G>C; p.H4070S). Of note, [facility], [facility], [employer], and [facility] currently classify this variant as likely benign or benign, while [facility], [facility], [facility], and several other laboratories classify it as a VUS. \par \par At this time the available evidence is insufficient to determine the role of this VUS in disease and the clinical significance of this result is uncertain. Individuals with a pathogenic mutation in BRCA1 may have an increased risk for breast, ovarian, pancreatic, and prostate cancers. It is unknown if the patient’s father has an increased risk for the cancers associated with BRCA1 at this time. The detection of this VUS should not currently change the patient’s medical management. It is NOT recommended at this time that family members use this result for predictive genetic testing or medical management decisions. With more research, a VUS may be reclassified as either disease-causing or benign.\par 	\par RISK ASSESSMENT:\par Ms. Vidal’s family history of her father’s prostate cancer at age 78, her maternal aunt’s breast cancer at age 67, and her paternal first cousin’s passing from breast cancer at age 42 is suggestive of an inherited predisposition to breast and related cancers. We recommended genetic testing for [facility]s Breast and Gynecologic Cancers Guidelines-Based Panel since her father’s genetic testing did not include all of the genes currently associated with breast cancer. This test analyzes 19 genes: FRANCISCO J, BARD1, BRIP1, BRCA2, BRCA2, CDH1, CHEK2, EPCAM, MLH1, MSH2, MSH6, NF1, PALB2, PTEN, PMS2, RAD51C, RAD51D, STK11, TP53.\par \par We discussed the risks, benefits and limitations, and implications of genetic testing. We also discussed the psychosocial implications of genetic testing. Possible test results were reviewed with Ms. Vidal, along with associated medical management options. The Genetic Information Non-discrimination Act (BHANU) was also reviewed. \par \par Ms. Vidal consented to the above-mentioned genetic testing panel. Blood was drawn in our laboratory and sent to [facility] today.\par \par PLAN:\par \par 1.	Blood drawn today will be sent to [facility] for analysis. \par 2.	We will contact Ms. Vidal once the results are available and will schedule a follow-up appointment, as needed. Results generally return in 2-3 weeks from the day the sample is received in the lab.\par 3.	Family member’s report(s) will be scanned to [facility] secure file cabinet.\par \par \par For any additional questions please call [facility] at [phone]. \par \par \par Elisa Warren MS, [facility] [place]\par Genetic Counselor, [facility]\par \par \par CC: Yadira Mcdaniel MS, CGC

## 2023-07-13 ENCOUNTER — TRANSCRIPTION ENCOUNTER (OUTPATIENT)
Age: 53
End: 2023-07-13

## 2023-07-13 LAB
ALBUMIN SERPL ELPH-MCNC: 4.4 G/DL
ALP BLD-CCNC: 20 U/L
ALT SERPL-CCNC: 13 U/L
ANION GAP SERPL CALC-SCNC: 11 MMOL/L
AST SERPL-CCNC: 22 U/L
BILIRUB SERPL-MCNC: 0.4 MG/DL
BUN SERPL-MCNC: 14 MG/DL
CALCIUM SERPL-MCNC: 9.6 MG/DL
CHLORIDE SERPL-SCNC: 103 MMOL/L
CHOLEST SERPL-MCNC: 189 MG/DL
CO2 SERPL-SCNC: 27 MMOL/L
CREAT SERPL-MCNC: 0.87 MG/DL
EGFR: 81 ML/MIN/1.73M2
ESTIMATED AVERAGE GLUCOSE: 111 MG/DL
GLUCOSE SERPL-MCNC: 97 MG/DL
HBA1C MFR BLD HPLC: 5.5 %
HDLC SERPL-MCNC: 97 MG/DL
LDLC SERPL DIRECT ASSAY-MCNC: 90 MG/DL
POTASSIUM SERPL-SCNC: 4.7 MMOL/L
PROT SERPL-MCNC: 7.5 G/DL
SODIUM SERPL-SCNC: 142 MMOL/L
TRIGL SERPL-MCNC: 33 MG/DL
TSH SERPL-ACNC: 1.5 UIU/ML

## 2023-07-14 LAB — APO LP(A) SERPL-MCNC: 95.4 NMOL/L

## 2023-07-18 ENCOUNTER — FORM ENCOUNTER (OUTPATIENT)
Age: 53
End: 2023-07-18

## 2023-07-21 ENCOUNTER — NON-APPOINTMENT (OUTPATIENT)
Age: 53
End: 2023-07-21

## 2023-07-21 NOTE — DISCUSSION/SUMMARY
[FreeTextEntry1] : RESULTS TRANSMISSION\par Irena Vidal is a 52-year-old female who was called 7/20/2023 for a discussion regarding their genetic testing results related to hereditary cancer predisposition. \par \par Ms. Vidal was originally seen at [facility] on 7/11/2023 for hereditary cancer predisposition risk assessment due to a family history of cancer. Ms. Vidal decided to pursue genetic testing using [facility]’s Breast and Gynecologic Cancers Guidelines-Based Panel.\par \par TEST RESULTS: NEGATIVE\par \par No pathogenic (disease-causing) variants or VUSs were detected in the following genes: FRANCISCO J, BARD1, BRIP1, BRCA2, BRCA2, CDH1, CHEK2, EPCAM, MLH1, MSH2, MSH6, NF1, PALB2, PTEN, PMS2, RAD51C, RAD51D, STK11, TP53.\par \par RESULTS INTERPRETATION AND ASSESSMENT:\par Given Ms. Vidal’s current reported family history of cancer, and her negative genetic test results, the following screening guidelines and risk-reducing recommendations were discussed:\par \par BREAST: \par Ms. Vidal should practice age-appropriate annual breast cancer screening at the discretion of her provider.\par \par OTHER: \par - In the absence of other indications, Ms. Vidal should practice age-appropriate cancer screening of other organ systems as recommended for the general population.\par \par We also discussed that, while no cause of the patient’s personal and family history of cancer was identified, this result, while reassuring, does entirely not rule out a hereditary cancer risk in the patient. It is possible, although unlikely, the patient has a mutation in one of the genes tested that is not detectable by this analysis, or has a mutation in a different gene, either known or unknown. It is also possible there is a hereditary cancer predisposition in the family, but the patient did not inherit it.\par \par We informed Ms. Vidal that our knowledge of genetics and inherited cancer conditions is changing rapidly. Therefore, we recommended that Ms. Vidal contact our office, every 2 to 3 years, to discuss relevant advances in cancer genetics.  We emphasized the importance of re-contacting us with updates regarding her personal and family history of cancer as well as any updates regarding additional cancer genetic test results performed for the patient and/or family members.  Such updates could possibly change our risk assessment and recommendations. \par \par PLAN:\par 1.	See above for recommended screening and risk-reduction strategies.\par 2.	Patient informed consult note(s) will be available through their [facility] patient portal and genetic test results will be released via [facility] laboratory portal. \par 3.	Ms. Vidal was encouraged to contact us every 2-3 years to discuss relevant advances in cancer genetics, or sooner if there are any changes in her personal or family history of cancer.\par \par \par For any additional questions please call [facility] at [phone]. \par \par \par Elisa Warren MS, [facility] [place]par Genetic Counselor, [facility]

## 2023-08-01 ENCOUNTER — TRANSCRIPTION ENCOUNTER (OUTPATIENT)
Age: 53
End: 2023-08-01

## 2023-09-11 LAB
ACID FAST STN SPT: NORMAL
BACTERIA SPT CF RESP CULT: NORMAL

## 2023-09-12 ENCOUNTER — TRANSCRIPTION ENCOUNTER (OUTPATIENT)
Age: 53
End: 2023-09-12

## 2023-09-20 ENCOUNTER — NON-APPOINTMENT (OUTPATIENT)
Age: 53
End: 2023-09-20

## 2023-10-03 ENCOUNTER — RX RENEWAL (OUTPATIENT)
Age: 53
End: 2023-10-03

## 2023-10-06 ENCOUNTER — APPOINTMENT (OUTPATIENT)
Dept: ENDOCRINOLOGY | Facility: CLINIC | Age: 53
End: 2023-10-06
Payer: COMMERCIAL

## 2023-10-06 VITALS
TEMPERATURE: 97.3 F | HEART RATE: 83 BPM | DIASTOLIC BLOOD PRESSURE: 89 MMHG | WEIGHT: 132 LBS | OXYGEN SATURATION: 99 % | SYSTOLIC BLOOD PRESSURE: 131 MMHG | BODY MASS INDEX: 20 KG/M2 | HEIGHT: 68 IN

## 2023-10-06 DIAGNOSIS — D72.819 DECREASED WHITE BLOOD CELL COUNT, UNSPECIFIED: ICD-10-CM

## 2023-10-06 PROCEDURE — 99214 OFFICE O/P EST MOD 30 MIN: CPT

## 2023-10-08 PROBLEM — D72.819 LEUKOPENIA: Status: ACTIVE | Noted: 2023-10-08

## 2023-10-10 ENCOUNTER — RX RENEWAL (OUTPATIENT)
Age: 53
End: 2023-10-10

## 2023-11-14 ENCOUNTER — APPOINTMENT (OUTPATIENT)
Dept: CT IMAGING | Facility: IMAGING CENTER | Age: 53
End: 2023-11-14
Payer: COMMERCIAL

## 2023-11-14 ENCOUNTER — OUTPATIENT (OUTPATIENT)
Dept: OUTPATIENT SERVICES | Facility: HOSPITAL | Age: 53
LOS: 1 days | End: 2023-11-14
Payer: COMMERCIAL

## 2023-11-14 DIAGNOSIS — J47.9 BRONCHIECTASIS, UNCOMPLICATED: ICD-10-CM

## 2023-11-14 DIAGNOSIS — E84.9 CYSTIC FIBROSIS, UNSPECIFIED: ICD-10-CM

## 2023-11-14 PROCEDURE — 71250 CT THORAX DX C-: CPT

## 2023-11-14 PROCEDURE — 71250 CT THORAX DX C-: CPT | Mod: 26,MH

## 2023-11-15 ENCOUNTER — RESULT REVIEW (OUTPATIENT)
Age: 53
End: 2023-11-15

## 2023-11-29 ENCOUNTER — APPOINTMENT (OUTPATIENT)
Dept: HEART AND VASCULAR | Facility: CLINIC | Age: 53
End: 2023-11-29
Payer: COMMERCIAL

## 2023-11-29 ENCOUNTER — OUTPATIENT (OUTPATIENT)
Dept: OUTPATIENT SERVICES | Facility: HOSPITAL | Age: 53
LOS: 1 days | End: 2023-11-29
Payer: COMMERCIAL

## 2023-11-29 VITALS — DIASTOLIC BLOOD PRESSURE: 78 MMHG | SYSTOLIC BLOOD PRESSURE: 132 MMHG

## 2023-11-29 VITALS
SYSTOLIC BLOOD PRESSURE: 152 MMHG | OXYGEN SATURATION: 99 % | WEIGHT: 135.4 LBS | BODY MASS INDEX: 20.52 KG/M2 | DIASTOLIC BLOOD PRESSURE: 92 MMHG | HEART RATE: 68 BPM | HEIGHT: 68 IN | RESPIRATION RATE: 18 BRPM

## 2023-11-29 DIAGNOSIS — R03.0 ELEVATED BLOOD-PRESSURE READING, WITHOUT DIAGNOSIS OF HYPERTENSION: ICD-10-CM

## 2023-11-29 PROCEDURE — 93306 TTE W/DOPPLER COMPLETE: CPT | Mod: 26

## 2023-11-29 PROCEDURE — 99214 OFFICE O/P EST MOD 30 MIN: CPT

## 2023-11-29 PROCEDURE — 93306 TTE W/DOPPLER COMPLETE: CPT

## 2023-12-06 ENCOUNTER — NON-APPOINTMENT (OUTPATIENT)
Age: 53
End: 2023-12-06

## 2023-12-06 PROBLEM — J47.9 BRONCHIECTASIS: Status: ACTIVE | Noted: 2023-05-03

## 2023-12-06 PROBLEM — E55.9 VITAMIN D DEFICIENCY: Status: ACTIVE | Noted: 2018-08-10

## 2023-12-06 PROBLEM — E03.9 PRIMARY HYPOTHYROIDISM: Status: ACTIVE | Noted: 2018-08-09

## 2023-12-06 PROBLEM — F41.9 ANXIETY: Status: ACTIVE | Noted: 2019-04-30

## 2023-12-07 ENCOUNTER — APPOINTMENT (OUTPATIENT)
Dept: PULMONOLOGY | Facility: CLINIC | Age: 53
End: 2023-12-07
Payer: COMMERCIAL

## 2023-12-07 VITALS — BODY MASS INDEX: 20.83 KG/M2 | WEIGHT: 137 LBS

## 2023-12-07 VITALS
DIASTOLIC BLOOD PRESSURE: 78 MMHG | TEMPERATURE: 98.6 F | OXYGEN SATURATION: 99 % | RESPIRATION RATE: 14 BRPM | HEART RATE: 68 BPM | SYSTOLIC BLOOD PRESSURE: 122 MMHG

## 2023-12-07 VITALS — HEIGHT: 68 IN | BODY MASS INDEX: 20.83 KG/M2

## 2023-12-07 DIAGNOSIS — F41.9 ANXIETY DISORDER, UNSPECIFIED: ICD-10-CM

## 2023-12-07 DIAGNOSIS — E03.9 HYPOTHYROIDISM, UNSPECIFIED: ICD-10-CM

## 2023-12-07 DIAGNOSIS — E55.9 VITAMIN D DEFICIENCY, UNSPECIFIED: ICD-10-CM

## 2023-12-07 DIAGNOSIS — E61.0 COPPER DEFICIENCY: ICD-10-CM

## 2023-12-07 DIAGNOSIS — J47.9 BRONCHIECTASIS, UNCOMPLICATED: ICD-10-CM

## 2023-12-07 PROCEDURE — 99215 OFFICE O/P EST HI 40 MIN: CPT

## 2023-12-18 LAB — BACTERIA SPT CF RESP CULT: NORMAL

## 2023-12-28 NOTE — ED ADULT NURSE NOTE - NS ED NURSE LEVEL OF CONSCIOUSNESS SPEECH
This patient is being seen in consultation from ***.    CHIEF COMPLAINT(S): No chief complaint on file.      HISTORY OF PRESENT ILLNESS:  Yazmin Shen is a 60 year old {[facility] right/left handed:479778} female who is here for an initial evaluation for left knee pain which began ***. Patient states ***. Today She rates the pain ***/10, describing it as *** and localizing it to ***. The pain is worsened with ***. She *** neurological or mechanical symptoms. ***    Accompanied by ***  Location:{[facility] joint location:343229}  Date of Onset: ***  Context: ***  Did this injury occur at work: {AgentPairkyesno:180105}  Severity:{#:49461}/10   Timing: {k pain timin}  Quality: {[facility] pain adjective:562049}  Associated signs and symptoms: {[facility] pain related symptoms:535039}  Aggravating factors: {[facility] aggrevating factors:836733}  Alleviating factors: {[facility] alleviating factors:822890}  Occupation: ***  ***    Review of Systems    The patient was instructed to follow up with PCP regarding all positive ROS that were not directly pertinent with the chief complaint.    Past Medical History Updated: {YES (DEF)/NO:91952}  PAST MEDICAL HISTORY:  Past Medical History:   Diagnosis Date    Adhesive capsulitis of shoulder 2005    Blood clot associated with vein wall inflammation     DVT after traveling -     Disorder of bursae and tendons in shoulder region 3/1/2005    Factor 5 Leiden mutation, heterozygous (CMD)     High cholesterol     PONV (postoperative nausea and vomiting)     Seasonal allergies     Vertigo        Past Surgical History Updated: {YES (DEF)/NO:10220}  PAST SURGICAL HISTORY:  Past Surgical History:   Procedure Laterality Date    Breast lumpectomy      benign    D and c      Mandible surgery      Patellectomy Right     Shoulder arthroscopy Bilateral        Past Family History Updated: {YES (DEF)/NO:20538}  FAMILY HISTORY:  Family History   Problem Relation Age of Onset    Cancer, Breast Mother 50    Cancer,  Kidney Father 66    Atrial Fibrilliation Father     Patient is unaware of any medical problems Brother     Dementia/Alzheimers Maternal Grandmother     Pacemaker Maternal Grandfather     Heart Maternal Grandfather     Blood Disorder Paternal Grandfather     Patient is unaware of any medical problems Brother     Cancer, Lung Maternal Aunt         smoker     Reviewed and non-contributory to patient's illness unless otherwise stated above    Past Social History Updated: {YES (DEF)/NO:53757}  SOCIAL HISTORY:  Social History     Socioeconomic History    Marital status: /Civil Union     Spouse name: Not on file    Number of children: Not on file    Years of education: Not on file    Highest education level: Not on file   Occupational History    Not on file   Tobacco Use    Smoking status: Former    Smokeless tobacco: Never   Vaping Use    Vaping Use: never used   Substance and Sexual Activity    Alcohol use: Yes     Alcohol/week: 2.0 standard drinks of alcohol     Types: 2 Cans of beer per week     Comment: occ    Drug use: Never    Sexual activity: Not on file   Other Topics Concern    Not on file   Social History Narrative    Not on file     Social Determinants of Health     Financial Resource Strain: Not on file   Food Insecurity: Not on file   Transportation Needs: Not on file   Physical Activity: Not on file   Stress: Not on file   Social Connections: Not on file   Interpersonal Safety: Not on file       MEDICATIONS:  Current Outpatient Medications   Medication Sig Dispense Refill    vitamin D3 (CHOLECALCIFEROL) 1.25 mg (50,000 units) capsule TAKE 1 TABLET BY MOUTH 1 DAY A WEEK. 12 capsule 0    simvastatin (ZOCOR) 5 MG tablet Take 5 mg by mouth nightly.      azelastine-fluticasone (DYMISTA) 137-50 MCG/ACT Suspension SPRAY 1 SPRAY INTO EACH NOSTRIL EVERY DAY 1 each 11    ciclopirox (PENLAC) 8 % topical solution Apply over nail and surrounding skin. Apply daily over previous coat. After seven (7) days, may  remove with alcohol and continue cycle.       No current facility-administered medications for this visit.     ALLERGIES:     ALLERGIES:   Allergen Reactions    Morphine RASH    Ciprofloxacin RASH     Pruritic rash and feeling of pins and needles all over body after first dose 2/14/21       VITAL SIGNS:  Visit Vitals  LMP 07/09/2013     There is no height or weight on file to calculate BMI.    Roomed by: ***    EXAM:  This is a 60 year old female, awake, alert, and cooperative. She is well nourished, well developed, and in no apparent distress.  Pulmonary - No increased work of breathing.  Lymphatics - There is no evidence of generalized adenopathy.  MSK:  ***    IMAGING &TEST:  ***    ASSESSMENT & PLAN:  Yazmin Shen is a 60 year old female with ***   No diagnosis found.     Plan as follows:  1. ***  2. ***  3. ***  4. ***     Restrictions: ***    The patient will follow up with us in {NUMBERS 1-31:445011} {days wks mos yr:232922}      ***    ***    Total time was *** minutes. This includes chart review before the visit, actual time spent with patient, and time spent on documentation after the visit.    This note has been routed to Katharine Arevalo MD    Note to patient: The 21st Century Cures Act makes medical notes like these available to patients in the interest of transparency. However, be advised this is a medical document. It is intended as peer to peer communication. It is written in medical language and may contain abbreviations or verbiage that are unfamiliar. It may appear blunt or direct. Medical documents are intended to carry relevant information, facts as evident, and the clinical opinion of the practitioner.         Strength:  5/5 Quadriceps strength  5/5 Hamstring strength    Palpation:  Localized TTP at distal biceps femoris tendon  No tenderness to palpation of the medial or lateral joint lines.   No tenderness to palpation of the patellar facets.  No tenderness to palpation of the patellar or quadriceps tendons.     Special Testing:  Negative apprehension testing.   Grade 1A Lachman's   Valgus and Varus stress testing at 0 and 30 degrees normal and non-painful.   Anterior drawer and posterior drawer negative.   Negative Vicky's testing.     Examination of the contralateral knee grossly within normal limits.     Neurologic Examination:   Bilateral sensation to light touch is symmetric and intact within dermatomes L2-S1.   Bilateral Patellar Reflex 2+   Bilateral Achilles Reflex 2+     Vascular Examination:   Bilateral Dorsalis Pedis and Posterior Tibialis Pulses 2+       IMAGING &TEST:  Radiology:  X-rays of the left knee in 4 views from 1/3/2024 were personally reviewed and interpreted by me.  These show mild degenerative changes of the bilateral knees.  No acute fracture or dislocation.      ASSESSMENT & PLAN:  Yazmin Shen is a 60 year old female with chronic posterolateral left knee pain since June 2023 after an injury.  Her pain is most consistent with biceps femoris tendinopathy, she has localized pain right at the tendon distally.  So far she has only iced the day of the injury and has not taking any anti-inflammatory medications.  She states she is still very functional able to work out and do squats and all other workouts without too much issue.  Her main pain is when extending her knee fully, she will have pain at posterolateral knee where the biceps femoris tendon is.  Discussed pathology of the above and treatment options, will treat conservatively at this time with PT, NSAIDs, icing    Chronic pain of left knee  (primary encounter diagnosis)  Plan: XR KNEE 3 VIEWS LEFT AND AP STANDING  BILATERAL    Biceps femoris tendonitis       Plan as follows:  1.  PT  2.  Ice 20 minutes 3 times a day  3.  Take naproxen as directed for 10 days. Take with food. Advised of risks of GI and renal side effects  4. Follow up in 6 weeks if not improved, can consider MRI      Restrictions: use as tolerated, avoid hyperextension    The patient will follow up with us in 6 weeks      On 1/3/2024, Chari GUSTAFSON LAT was present during the encounter and assisted in the documentation of the services performed by DO SJ Segura personallly obtained history, examined the patient, reviewed relevant imaging, and determined the assessment and plan.  I personally performed the medical decision making and discussed this with the patient/family. All clinical staff and/or scribe documentation is noted and accepted as an accurate representation of the clinical visit and plan of care.   Electronically signed by: Mo Ortega DO  1/3/2024     Total time was 30 minutes. This includes chart review before the visit, actual time spent with patient, and time spent on documentation after the visit.    This note has been routed to Katharine Arevalo MD    Note to patient: The 21st Century Cures Act makes medical notes like these available to patients in the interest of transparency. However, be advised this is a medical document. It is intended as peer to peer communication. It is written in medical language and may contain abbreviations or verbiage that are unfamiliar. It may appear blunt or direct. Medical documents are intended to carry relevant information, facts as evident, and the clinical opinion of the practitioner.     Speaking Coherently

## 2024-01-03 LAB — FUNGUS SPT CULT: ABNORMAL

## 2024-01-25 LAB — ACID FAST STN SPT: NORMAL

## 2024-01-30 ENCOUNTER — NON-APPOINTMENT (OUTPATIENT)
Age: 54
End: 2024-01-30

## 2024-03-06 ENCOUNTER — NON-APPOINTMENT (OUTPATIENT)
Age: 54
End: 2024-03-06

## 2024-03-27 ENCOUNTER — NON-APPOINTMENT (OUTPATIENT)
Age: 54
End: 2024-03-27

## 2024-03-31 PROBLEM — E78.41 ELEVATED LP(A): Status: ACTIVE | Noted: 2021-05-02

## 2024-03-31 PROBLEM — R03.0 ELEVATED BLOOD PRESSURE READING: Status: ACTIVE | Noted: 2022-06-19

## 2024-03-31 PROBLEM — E78.5 DYSLIPIDEMIA: Status: ACTIVE | Noted: 2023-07-12

## 2024-04-03 ENCOUNTER — APPOINTMENT (OUTPATIENT)
Dept: HEART AND VASCULAR | Facility: CLINIC | Age: 54
End: 2024-04-03
Payer: COMMERCIAL

## 2024-04-03 VITALS
HEIGHT: 68 IN | BODY MASS INDEX: 20 KG/M2 | SYSTOLIC BLOOD PRESSURE: 138 MMHG | HEART RATE: 71 BPM | WEIGHT: 132 LBS | DIASTOLIC BLOOD PRESSURE: 81 MMHG

## 2024-04-03 DIAGNOSIS — R03.0 ELEVATED BLOOD-PRESSURE READING, W/OUT DIAGNOSIS OF HYPERTENSION: ICD-10-CM

## 2024-04-03 DIAGNOSIS — E78.41 ELEVATED LIPOPROTEIN(A): ICD-10-CM

## 2024-04-03 DIAGNOSIS — E78.5 HYPERLIPIDEMIA, UNSPECIFIED: ICD-10-CM

## 2024-04-03 PROCEDURE — 99214 OFFICE O/P EST MOD 30 MIN: CPT

## 2024-04-03 NOTE — ASSESSMENT
[FreeTextEntry1] : ======================================================================================= 1. Dyslipidemia: PCSK9-I not initiated secondary to a concern of a possible contraindication in patients with CIDP:  LDL 81 (04/02/24)     - discussed therapeutic lifestyle changes to promote improved lipid metabolism     - continue pravastatin 20mg po qd     - continue ezetimibe 10mgpo qd   2. Borderline HTN: BP near ACC/AHA 2017 guideline target:     - continue off anti-HTN medications at this time   3. Right bundle branch block: no evidence of advanced conduction disease: s/p normal echocardiogram (11/29/23):      - will manage conservatively at this time   4. CIDP:     - continue IVIG      - follow up with neurologist at [facility], Yulia Escobedo MD   This encounter was performed as a telehealth encounter employing the [employer], [employer], or other approved audio/video platform.  All components of the evaluation and management were performed per clinical routine with the exception of the physical exam.  The physical exam references my most recent physical exam plus any additional information provided by the patient (i.e. ambulatory vitals/weight) or inspection from the video portion of the encounter.   I spent in excess of 40 minutes on the encounter.    Verbal consent was given on 04/03/24 by Irena Vidal.   Patient location: The patient was located at the primary address listed in the medical record. Physician location: I was located in my office in [facility].

## 2024-04-03 NOTE — HISTORY OF PRESENT ILLNESS
[FreeTextEntry1] : Ms. Vidal presents for follow up and management of cystic fibrosis, CIDP, GERD, hypothyroidism, dyslipidemia, migraine headache, cervical spine disease, and HTN.  In 2003, she experienced a syncopal episode while riding the [employer]IR[employer].  She was followed by other cardiologists, Gm Recinos MD and Stephanie Schmid MD and was noted to have an elevated lipoprotein a (127 on 03/01/21).  She has been on pravastatin but has had no history of intolerance to more potent statins.  At present, she denies chest pain and has FLYNN to 2 flights of stairs.  She had a chiropractic procedure which resulted in a right cervical neck injury.  She went on to have a cervical nerve ablation to try to help improve the pain.  On 11/29/23, she had an echocardiogram which revealed an EF of 64% and was a normal study.

## 2024-04-03 NOTE — REASON FOR VISIT
[FreeTextEntry1] : ======================================================================================= Diagnostic Tests: ----------------------------------------------- EC23: sinus rhythm, JUDE, RBBB.  22: sinus rhythm, RBBB.  ----------------------------------------------- Echo: 23: EF 64%, normal study.  21: EF 68%, grade I diastolic dysfunction, mild MR, mild TR.  18: EF 67%, grade I diastolic dysfunction, mild MR, mild TR.  ----------------------------------------------- Carotid arteries: 22: sono: mild MEKA atherosclerosis.

## 2024-04-16 ENCOUNTER — TRANSCRIPTION ENCOUNTER (OUTPATIENT)
Age: 54
End: 2024-04-16

## 2024-04-24 ENCOUNTER — RX RENEWAL (OUTPATIENT)
Age: 54
End: 2024-04-24

## 2024-05-06 ENCOUNTER — NON-APPOINTMENT (OUTPATIENT)
Age: 54
End: 2024-05-06

## 2024-05-07 ENCOUNTER — APPOINTMENT (OUTPATIENT)
Dept: PULMONOLOGY | Facility: CLINIC | Age: 54
End: 2024-05-07
Payer: COMMERCIAL

## 2024-05-07 VITALS — HEIGHT: 68 IN | RESPIRATION RATE: 16 BRPM | WEIGHT: 132 LBS | BODY MASS INDEX: 20 KG/M2

## 2024-05-07 DIAGNOSIS — K21.9 GASTRO-ESOPHAGEAL REFLUX DISEASE W/OUT ESOPHAGITIS: ICD-10-CM

## 2024-05-07 DIAGNOSIS — J30.2 OTHER SEASONAL ALLERGIC RHINITIS: ICD-10-CM

## 2024-05-07 DIAGNOSIS — E84.9 CYSTIC FIBROSIS, UNSPECIFIED: ICD-10-CM

## 2024-05-07 DIAGNOSIS — Z91.89 OTHER SPECIFIED PERSONAL RISK FACTORS, NOT ELSEWHERE CLASSIFIED: ICD-10-CM

## 2024-05-07 DIAGNOSIS — J45.909 UNSPECIFIED ASTHMA, UNCOMPLICATED: ICD-10-CM

## 2024-05-07 PROCEDURE — G2211 COMPLEX E/M VISIT ADD ON: CPT

## 2024-05-07 PROCEDURE — 99215 OFFICE O/P EST HI 40 MIN: CPT

## 2024-05-07 RX ORDER — VITAMIN K2 90 MCG
125 MCG CAPSULE ORAL
Refills: 0 | Status: ACTIVE | COMMUNITY
Start: 2019-08-05

## 2024-05-07 RX ORDER — MONTELUKAST 10 MG/1
10 TABLET, FILM COATED ORAL
Qty: 90 | Refills: 3 | Status: ACTIVE | COMMUNITY
Start: 2018-08-12

## 2024-05-07 RX ORDER — ALBUTEROL SULFATE 2.5 MG/3ML
(2.5 MG/3ML) SOLUTION RESPIRATORY (INHALATION)
Qty: 3 | Refills: 3 | Status: ACTIVE | COMMUNITY
Start: 2022-10-24

## 2024-05-07 RX ORDER — FLUTICASONE PROPIONATE AND SALMETEROL 100; 50 UG/1; UG/1
100-50 POWDER RESPIRATORY (INHALATION)
Qty: 3 | Refills: 3 | Status: ACTIVE | COMMUNITY
Start: 2020-05-01

## 2024-05-07 RX ORDER — FUROSEMIDE 20 MG/1
20 TABLET ORAL
Qty: 30 | Refills: 11 | Status: ACTIVE | COMMUNITY
Start: 2019-05-02

## 2024-05-07 RX ORDER — PRAVASTATIN SODIUM 20 MG/1
20 TABLET ORAL
Qty: 30 | Refills: 11 | Status: ACTIVE | COMMUNITY
Start: 2022-10-12

## 2024-05-07 RX ORDER — ESTRADIOL 0.07 MG/D
0.07 PATCH, EXTENDED RELEASE TRANSDERMAL
Refills: 0 | Status: ACTIVE | COMMUNITY
Start: 2023-04-22

## 2024-05-07 RX ORDER — IMMUNE GLOBULIN INTRAVENOUS (HUMAN) 10 G
10 KIT INTRAVENOUS
Refills: 0 | Status: ACTIVE | COMMUNITY
Start: 2019-08-05

## 2024-05-07 RX ORDER — ALBUTEROL SULFATE 90 UG/1
108 (90 BASE) INHALANT RESPIRATORY (INHALATION)
Qty: 3 | Refills: 3 | Status: ACTIVE | COMMUNITY
Start: 2023-05-01

## 2024-05-07 RX ORDER — LEVOTHYROXINE SODIUM 0.15 MG/1
150 TABLET ORAL
Qty: 65 | Refills: 3 | Status: ACTIVE | COMMUNITY
Start: 2022-07-13

## 2024-05-07 RX ORDER — EZETIMIBE 10 MG/1
10 TABLET ORAL
Qty: 90 | Refills: 3 | Status: ACTIVE | COMMUNITY
Start: 2023-09-19

## 2024-05-07 RX ORDER — EVOLOCUMAB 140 MG/ML
140 INJECTION, SOLUTION SUBCUTANEOUS
Qty: 6 | Refills: 3 | Status: DISCONTINUED | COMMUNITY
Start: 2023-07-12 | End: 2024-05-07

## 2024-05-07 RX ORDER — CALCIUM CARBONATE/VITAMIN D3 500-10/5ML
2 LIQUID (ML) ORAL
Refills: 0 | Status: ACTIVE | COMMUNITY

## 2024-05-07 RX ORDER — ESCITALOPRAM OXALATE 5 MG/1
5 TABLET ORAL DAILY
Qty: 30 | Refills: 11 | Status: ACTIVE | COMMUNITY
Start: 2023-10-06

## 2024-05-07 RX ORDER — SODIUM CHLORIDE FOR INHALATION 7 %
7 VIAL, NEBULIZER (ML) INHALATION
Qty: 3 | Refills: 3 | Status: ACTIVE | COMMUNITY
Start: 2022-03-07

## 2024-05-07 RX ORDER — PROGESTERONE 100 MG/1
100 CAPSULE ORAL
Refills: 0 | Status: ACTIVE | COMMUNITY
Start: 2020-10-22

## 2024-05-07 RX ORDER — ONDANSETRON 4 MG/1
4 TABLET ORAL
Qty: 12 | Refills: 5 | Status: ACTIVE | COMMUNITY
Start: 2018-08-12

## 2024-05-07 RX ORDER — TEZACAFTOR AND IVACAFTOR 100-150 MG
100-150 & 150 KIT ORAL
Qty: 56 | Refills: 3 | Status: ACTIVE | COMMUNITY
Start: 2018-08-12

## 2024-05-17 DIAGNOSIS — Z13.1 ENCOUNTER FOR SCREENING FOR DIABETES MELLITUS: ICD-10-CM

## 2024-05-17 DIAGNOSIS — E56.9 VITAMIN DEFICIENCY, UNSPECIFIED: ICD-10-CM

## 2024-05-17 DIAGNOSIS — Z91.89 OTHER SPECIFIED PERSONAL RISK FACTORS, NOT ELSEWHERE CLASSIFIED: ICD-10-CM

## 2024-06-02 NOTE — PROCEDURE
[FreeTextEntry1] : [facility] PFT 5/7/2024: reviewed and scan to record. FVC (L) 3.27 (84%) FEV1 (L) 2.42 (79%) Ration 0.74 (93%) FEF 25-75 L/s 1.80 (63%)  5/2/23 [facility] FVC (L) 3.33 / 3.92 (85%) FEV1(L) 2.53 /3.11 (81%) EAL57-98 (L/s) 2.08 / 2.90 (72%) 11/7/2023: (office) FVC (L) 3.43 (97%) FEV1 (L) 2.66 (92%) FEF 25-75 L/s 2.32 (73%)  11/14/2023 INTERPRETATION: CT CHEST WITHOUT CONTRAST INDICATION: Cystic fibrosis. Bronchiectasis. TECHNIQUE: Unenhanced helical images were obtained of the chest. Coronal and sagittal images were reconstructed. Maximum intensity projection images were generated. COMPARISON: None. FINDINGS: Tubes/Lines: None. Lungs, airways and pleura: In the right lower lobe is a calcified nodule. The remainder of the lungs are clear. The included airways are normal. The pleura is normal. Mediastinum: Within the SVC is a tubular shaped area of high density. The aorta is normal in caliber. The visualized heart is normal. No pericardial effusion. Upper Abdomen: Cholecystectomy clips in the abdomen. Bones And Soft Tissues: The bones are unremarkable. The soft tissues are unremarkable. IMPRESSION: 1. Within the SVC is a tubular shaped area of high density that is of uncertain etiology. Study reviewed with attending Gianna Chavez with changes related prior catheter placement and does not require additional assessment.

## 2024-06-02 NOTE — END OF VISIT
[FreeTextEntry3] : CF follow-up Tele visit conducted with DIMITRIS De La Rosa. I agree with the detailed visit note as written. All interval history and data reviewed. The assessment and plan reflect our review and discussion. In-person visit next quarter, patient agreeable. Multi-disciplinary team input as noted. Plan to continue close oversight and follow-up of her CF and co-morbidities.

## 2024-06-02 NOTE — HISTORY OF PRESENT ILLNESS
[de-identified] : Overall the patient states she has been doing well. Irena is a 53-year-old  Female with Cystic Fibrosis (K130nsb/L206W) (FEV1 84%) on Symdeko after intolerant of Trikafta with severe hormonal fluctuations) Her visit today conducted in collaboration with DIMITRIS De La Rosa and an Airway clearance assessment PT Mi Hyde, see separate note.  Comorbidities include: Vitiligo, CIDP (on IVIG), IBS, GERD, Raynaud's syndrome, neck pain, hypothyroidism, seasonal allergic rhinitis, dyslipidemia, post cholecystectomy syndrome, and anxiety/depression as well as panic attacks. Interim addition of low dose Escitalopram has been very helpful.  She's doing well with minimal cough/sputum. No wheeze, sob, dyspnea on exertion, chest pain or hemoptysis noted. ACT 5 days per week and nebs also 5 days per week. Her exercise regimen continues with light weight training, HIIT and Pilates. She denies sinus symptoms or PND, Reflux only if misses ppi, normal bm's and denies bloating/abdominal discomfort. Seasonal allergies over the last few weeks and prefers not to use OTC preparations. Is on nasal budesonide and Singulair only and rinses off pollen upon returning indoors. Chronic neck pain without change. Thyroid replacement managed with Dr. Caldera and next f/u in June. Had labs done with Dr. Caldera between visits and will be scanned to record. Were reviewed. missing a few CF annual items to be caught up in June. Seen by Cardiologist Dr. Finkelstein recently and is managing cardiac risks. She is very happy with his care.

## 2024-06-02 NOTE — ASSESSMENT
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 Without present exacerbation and doing remarkably well. Continue with Symdeko, LFT up to date- scanned to record from Dr. Caldera labs. Continue present Airway Clearance: Vest/Nebs/ Exercise Nebs HS/albuterol daily Vest 5 days/week ZephyRx home PFT overall stable, variability with non-supervised [place]. Obtain in office study with Q3 visit. Send updated sputum for colonizations local [facility] lab, will send requisition. Has containers. CT Chest: Reviewed. No mention of Bronchiectasis.  DEXA 7/2021: normal BMD repeat this quarter.  Will send requisition and assist with location to obtain.  # RAD continue controller, rescue and Singulair # Seasonal allergies: increase nasal steroid for few weeks. Defers OTC meds. # GERD: Continue PPI and H2 blocker. Lifestyle interventions implement as able. # Anxiety/Depression: Restarted on low dose Lexapro with excellent response. # Hypothyroid Follows with Dr. Caldera [facility], dose of Synthroid altered to 5 day/wk administration. June f/u planned. Add to his labs as needed to complete full annuals. # Cardiology: Now with established care in [facility] system with Dr France. Borderline BP, elevated cholesterol, RBBB Normal ECHO Continue lipid agents. 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

## 2024-06-02 NOTE — REVIEW OF SYSTEMS
[Eyes Itch] : itching of the eyes [Negative] : Heme/Lymph [Fever] : no fever [Chills] : no chills [Feeling Poorly] : not feeling poorly [Feeling Tired] : not feeling tired [Recent Weight Gain (___ Lbs)] : no recent weight gain [Recent Weight Loss (___ Lbs)] : no recent weight loss [Earache] : no earache [Loss Of Hearing] : no hearing loss [Nosebleeds] : no nosebleeds [Nasal Discharge] : no nasal discharge [Sore Throat] : no sore throat [Hoarseness] : no hoarseness [Heart Rate Is Slow] : the heart rate was not slow [Heart Rate Is Fast] : the heart rate was not fast [Chest Pain] : no chest pain [Palpitations] : no palpitations [Leg Claudication] : no intermittent leg claudication [Lower Ext Edema] : no lower extremity edema [Shortness Of Breath] : no shortness of breath [Wheezing] : no wheezing [Cough] : no cough [SOB on Exertion] : no shortness of breath during exertion [Abdominal Pain] : no abdominal pain [Vomiting] : no vomiting [Constipation] : no constipation [Diarrhea] : no diarrhea [Heartburn] : no heartburn [Melena] : no melena [Dizziness] : no dizziness [Fainting] : no fainting [Sleep Disturbances] : no sleep disturbances

## 2024-06-15 ENCOUNTER — NON-APPOINTMENT (OUTPATIENT)
Age: 54
End: 2024-06-15

## 2024-06-22 ENCOUNTER — OUTPATIENT (OUTPATIENT)
Dept: OUTPATIENT SERVICES | Facility: HOSPITAL | Age: 54
LOS: 1 days | End: 2024-06-22
Payer: MEDICARE

## 2024-06-22 ENCOUNTER — APPOINTMENT (OUTPATIENT)
Dept: RADIOLOGY | Facility: HOSPITAL | Age: 54
End: 2024-06-22
Payer: COMMERCIAL

## 2024-06-22 DIAGNOSIS — E84.9 CYSTIC FIBROSIS, UNSPECIFIED: ICD-10-CM

## 2024-06-22 DIAGNOSIS — Z13.820 ENCOUNTER FOR SCREENING FOR OSTEOPOROSIS: ICD-10-CM

## 2024-06-22 DIAGNOSIS — Z91.89 OTHER SPECIFIED PERSONAL RISK FACTORS, NOT ELSEWHERE CLASSIFIED: ICD-10-CM

## 2024-06-22 DIAGNOSIS — M81.0 AGE-RELATED OSTEOPOROSIS WITHOUT CURRENT PATHOLOGICAL FRACTURE: ICD-10-CM

## 2024-06-22 PROCEDURE — 77080 DXA BONE DENSITY AXIAL: CPT | Mod: 26

## 2024-06-22 PROCEDURE — 77080 DXA BONE DENSITY AXIAL: CPT

## 2024-06-26 ENCOUNTER — NON-APPOINTMENT (OUTPATIENT)
Age: 54
End: 2024-06-26

## 2024-07-10 ENCOUNTER — RX RENEWAL (OUTPATIENT)
Age: 54
End: 2024-07-10

## 2024-08-01 ENCOUNTER — LABORATORY RESULT (OUTPATIENT)
Age: 54
End: 2024-08-01

## 2024-08-15 ENCOUNTER — APPOINTMENT (OUTPATIENT)
Dept: PULMONOLOGY | Facility: CLINIC | Age: 54
End: 2024-08-15

## 2024-08-15 DIAGNOSIS — M85.859 OTHER SPECIFIED DISORDERS OF BONE DENSITY AND STRUCTURE, UNSPECIFIED THIGH: ICD-10-CM

## 2024-08-15 DIAGNOSIS — Z86.19 PERSONAL HISTORY OF OTHER INFECTIOUS AND PARASITIC DISEASES: ICD-10-CM

## 2024-08-15 DIAGNOSIS — F41.9 ANXIETY DISORDER, UNSPECIFIED: ICD-10-CM

## 2024-08-15 DIAGNOSIS — K21.9 GASTRO-ESOPHAGEAL REFLUX DISEASE W/OUT ESOPHAGITIS: ICD-10-CM

## 2024-08-15 DIAGNOSIS — R53.83 OTHER FATIGUE: ICD-10-CM

## 2024-08-15 DIAGNOSIS — E84.9 CYSTIC FIBROSIS, UNSPECIFIED: ICD-10-CM

## 2024-08-15 DIAGNOSIS — Z72.820 SLEEP DEPRIVATION: ICD-10-CM

## 2024-08-15 DIAGNOSIS — G61.81 CHRONIC INFLAMMATORY DEMYELINATING POLYNEURITIS: ICD-10-CM

## 2024-08-15 DIAGNOSIS — R53.1 OTHER FATIGUE: ICD-10-CM

## 2024-08-15 PROCEDURE — 99215 OFFICE O/P EST HI 40 MIN: CPT

## 2024-08-15 NOTE — PROCEDURE
[FreeTextEntry1] : EXAM:  XR BONE DENSITY AXIAL    PROCEDURE DATE:  06/22/2024 INTERPRETATION:  CLINICAL INDICATION: 53-year-old postmenopausal female for evaluation of bone mineralization. Screening for osteoporosis.  Thank you for referring your patient for bone densitometry on the [employer]OL[employer] W ([id number]) machine.  The results are expressed as a T-score, or the standard deviation from the mean young normal bone mineral density value, which is the basis for the WHO diagnostic criteria for bone density. The reference standard from which the T-score is calculated is the female, white, age 20-29 years, NHANES III database. Z-score, the standard deviation from age, gender and race matched population is also reported.  WHO criteria for diagnosing osteoporosis using bone mineral density measurements: NORMAL: T-score equal to or above -1.0. OSTEOPENIA: T-score between -1.0 and -2.5. OSTEOPOROSIS: T-score at (equal to) or below -2.5.  COMPARISON: None. RESULTS: Spine: T-score: -0.8, normal. Z-score: 0.1 BMD: 0.960 g/cm2  Femoral neck: T-score: -2.1 left, osteopenia. Z-score: -1.2 BMD:  0.613 g/cm2 Total hip: T-score: -1.6 left, osteopenia. Z-score:  -1.0 BMD:  0.752 g/cm2 IMPRESSION: Osteopenia. FRACTURE RISK: Using the FRAX 10 year fracture risk calculator the patient's ten-year risk of any fracture is 5.9% and the patient's risk of  hip fracture  is  0.8%. Additional risk factors used in the FRAX calculation: None. TREATMENT RECOMMENDATIONS:  Based on NOF treatment guidelines medical therapy is not recommended at this time. All treatment decisions require clinical judgment and consideration of individual patient factors, including patient preferences, comorbidities, previous drug use, risk factors not captured in the FRAX model (e.g. frailty, falls, Vitamin D deficiency, increased bone turnover, interval significant decline in bone density) and possible under or over estimation of fracture risk by FRAX.  In addition the NOF Guide recommends that FDA-approved medical therapies be considered in postmenopausal woman and men age >= 50 years with a: * hip or vertebral (clinical or morphometric) fracture. * T-score of <=-2.5 at the spine or hip. * 10 years fracture probability by FRAX of >=  3.0% for hip fracture or >=  20% for major osteoporotic fracture. FOLLOW-UP: A repeat examination is recommended in 2 years.  Outside study 7/21 normal bmd noted and interval increase in LS spine compared to 12/2018- [facility]  Is scanned to record for review  Dec 2023 LS 1.077 gm/cm2 L Femur 0.84 gm/cm2 L femoral neck 0.749 gm/cm2  Micro 8/2/2024  CF resp cx: normal mercedes Fungal Cx: no growth, prelim AFB smear neg, no growth to date.

## 2024-08-15 NOTE — ASSESSMENT
Action 2023 JTV 1:10 PM    Action Taken CSS called patient. Patient did not . CSS LVM for patient to return call.     Action 2023 Jtv 3:04 PM    Action Taken CSS called patient. Patient states he has not been seen for the pain and is not taking any medications.      MEDICAL RECORDS REQUEST   [facility]  [address] [place] [address]  PHONE: [phone]  Fax: [phone]        FUTURE VISIT INFORMATION                                                   Stephon Wolfe, : 1993 scheduled for future visit at [facility]    APPOINTMENT INFORMATION:  Date: 10/04/2023  Provider:  Carlos Marquis PA-c  Reason for Visit/Diagnosis: testicular pain and pain when urinating      RECORDS REQUESTED FOR VISIT                                                     NOTES  STATUS/DETAILS   MEDICATION LIST  yes   LABS     URINALYSIS (UA)  yes   images  no     PRE-VISIT CHECKLIST      Joint diagnostic appointment coordinated correctly          (ensure right order & amount of time) Yes   RECORD COLLECTION COMPLETE yes      [FreeTextEntry1] : 53--year-old Female with CF Genetics ([id number]/L206W- Sweat chloride intermediate: (56/47) on modulator: Symdeko after intolerant of Trikafta related to hormonal fluctuations. Without present exacerbation and doing remarkably well from CF/ Respiratory standpoint Continue with Symdeko, LFT up to date- scanned to record from Dr. Caldera labs. Continue present Airway Clearance: Vest/Nebs/ Exercise Nebs HS/albuterol daily Vest 5 days/week ZephyRx home Aldair not done today given other medical symptoms. Obtain in office study with Q3/4visit. Reviewed sputum Done on Aug 1. Normal resp mercedes CT Chest: Reviewed. No mention of Bronchiectasis.   # RAD continue controller, rescue and Singulair # Seasonal allergies: increase nasal steroid for few weeks. Defers OTC meds. # GERD: Continue PPI and H2 blocker. Lifestyle interventions implement as able.  # Anxiety/Depression: Discussed possible restart of low dose Lexapro versus obtaining referral for psychopharmacologist to explore other options. Will revisit after new CIDP interventions.-SEE Below.  # Hypothyroid Follows with Dr. Caldera [facility]. He manages adjustments to her synthroid.  # Cardiology: Now with established care in [facility] with Dr France. Borderline BP, elevated cholesterol, RBBB Normal ECHO Continue lipid agents. Will arrange interval f/u.   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 with weakness, paresthesia and fatigue Her neurologist ordered new MRI, f/u after done and reviewed Plan for incremental increase in IVIG in coming week. Next steps with regard to both Mental Health and CIDP treatment to be determined pending and we will stay in close touch with her regarding her Neurologist's recommendations. Will as MH coordinator to check in with her as well in about 10 days.   # New Osteopenia hip/femur.  To discuss with Dr. Caldera at next f/u. No present changes given complicated landscape.  Continue wt beaing exercise and is on HRT. Discuss collaboratively with Dr. Caldera after next endo f/u.    Q 4 in person visit with PFT to be arranged

## 2024-08-15 NOTE — REASON FOR VISIT
[Home] : at home, [unfilled] , at the time of the visit. [Medical Office: ([facility])___] : at the medical office located in  [Patient] : the patient [Self] : self [Follow-Up] : a follow-up visit

## 2024-08-15 NOTE — HISTORY OF PRESENT ILLNESS
[FreeTextEntry1] : Irena is a 53-year-old  Female with Cystic Fibrosis ([id number]/L206W) (FEV1 84%) on Symdeko after intolerant of Trikafta with severe hormonal fluctuations) Her visit today conducted in collaboration with DIMITRIS De La Rosa. Last visit Q 2 May 7 2024.Comorbidities include: Vitiligo, CIDP (on IVIG), IBS, GERD, Raynaud's syndrome, neck pain, hypothyroidism, seasonal allergic rhinitis, dyslipidemia, post cholecystectomy syndrome, and anxiety/depression as well as panic attacks.  From a CF point of view she's doing well with minimal cough/sputum. No wheeze, sob, dyspnea on exertion, chest pain or hemoptysis noted. Recently less ACT due to weakness and flare of CIDP. She denies sinus symptoms or PND, Reflux only if misses ppi, normal bm's and denies bloating/abdominal discomfort. Seasonal allergies over the last few weeks and prefers not to use OTC preparations. Is on nasal budesonide and Singulair only and rinses off pollen upon returning indoors. She is taking Symdeko without missed doses. Chronic neck pain without change. She processed local sputum with [facility] lab a few weeks ago showing only Normal mercedes, fungal no growth to date, and AFB smear negative and cx in progress. She had DEXA in June at [facility] and last was [facility] in 2021. Lumbar spine continues with normal BMD but had decline from normal bmd in hip/femur to osteopenia. She is on HRT and exercises and will be discussing this with Dr. Caldera at her next follow up.  Seen today mostly in context of problem solving about current issues. Her CIDP has been worse since Lyme/rash therapy with Doxycycline and had a medrol pack Rx and s/p these she had a vaginal yeast infection that required treatment.. She is having an MRI ordered by her Neurologist and is also having an increase in IVIG dose in the coming week. What she has been experiencing is: inability to stand for a long time, burning in her feet, poor , anxiety and crazy thoughts/overthinking. She isn't able to keep up with usual activities and issues from both physical standpoint and feeling overwhelmed. She isn't experiencing any Suicidal ideation and is able to get out of bed and do things but feels like " things are too much" Has had to alter some daily activities and turn down outside invitations. With regard to the Lexapro: reports that the times it has been used it was effective at low dose but reports that at some point about 5-6 months in she starts to experience adverse effect with nightmares/crazy thoughts that affect her ability to sleep and she needs to go off of it. The first month off of it is hard as well as it takes 3-4 weeks of' "withdrawl/not feeling right" before stabilizing to baseline. She doesn't have a prescribing pharmacotherapist to work on this with to help determine other possible medications to trial and adjust- especialyy in setting of her chronic comorbidities and multiple medications.

## 2024-08-15 NOTE — REVIEW OF SYSTEMS
[Feeling Poorly] : feeling poorly [Feeling Tired] : feeling tired [Negative] : Eyes [Fever] : no fever [Chills] : no chills [Earache] : no earache [Nosebleeds] : no nosebleeds

## 2024-08-15 NOTE — PHYSICAL EXAM
[General Appearance - Well Developed] : well developed [Normal Appearance] : normal appearance [Well Groomed] : well groomed [General Appearance - Well Nourished] : well nourished [General Appearance - In No Acute Distress] : no acute distress [Normal Conjunctiva] : the conjunctiva exhibited no abnormalities [Neck Appearance] : the appearance of the neck was normal [Respiration, Rhythm And Depth] : normal respiratory rhythm and effort [Exaggerated Use Of Accessory Muscles For Inspiration] : no accessory muscle use [Involuntary Movements] : no involuntary movements were seen [] : no rash [FreeTextEntry1] : face/neck/arms. History of vitiligo, no obvious lesions on video [No Focal Deficits] : no focal deficits [Oriented To Time, Place, And Person] : oriented to person, place, and time [Impaired Insight] : insight and judgment were intact

## 2024-08-19 ENCOUNTER — NON-APPOINTMENT (OUTPATIENT)
Age: 54
End: 2024-08-19

## 2024-08-22 ENCOUNTER — APPOINTMENT (OUTPATIENT)
Dept: PULMONOLOGY | Facility: CLINIC | Age: 54
End: 2024-08-22

## 2024-09-10 ENCOUNTER — RX RENEWAL (OUTPATIENT)
Age: 54
End: 2024-09-10

## 2024-09-30 ENCOUNTER — RX RENEWAL (OUTPATIENT)
Age: 54
End: 2024-09-30

## 2024-10-10 ENCOUNTER — APPOINTMENT (OUTPATIENT)
Dept: ENDOCRINOLOGY | Facility: CLINIC | Age: 54
End: 2024-10-10

## 2024-10-10 VITALS
SYSTOLIC BLOOD PRESSURE: 126 MMHG | TEMPERATURE: 97.8 F | DIASTOLIC BLOOD PRESSURE: 77 MMHG | HEIGHT: 68 IN | WEIGHT: 139 LBS | BODY MASS INDEX: 21.07 KG/M2 | OXYGEN SATURATION: 99 % | HEART RATE: 70 BPM

## 2024-10-10 DIAGNOSIS — N95.1 MENOPAUSAL AND FEMALE CLIMACTERIC STATES: ICD-10-CM

## 2024-10-10 PROCEDURE — G2211 COMPLEX E/M VISIT ADD ON: CPT | Mod: NC

## 2024-10-10 PROCEDURE — 99214 OFFICE O/P EST MOD 30 MIN: CPT

## 2024-10-10 RX ORDER — PRAVASTATIN SODIUM 40 MG/1
40 TABLET ORAL
Qty: 30 | Refills: 11 | Status: ACTIVE | COMMUNITY
Start: 2024-10-10 | End: 1900-01-01

## 2024-10-10 RX ORDER — ESTRADIOL 0.1 MG/D
0.1 PATCH, EXTENDED RELEASE TRANSDERMAL
Refills: 0 | Status: ACTIVE | COMMUNITY
Start: 2024-10-10

## 2024-10-21 PROBLEM — E56.9 VITAMIN DEFICIENCY: Status: RESOLVED | Noted: 2024-05-17 | Resolved: 2024-10-21

## 2024-10-21 PROBLEM — Z13.1 DIABETES MELLITUS SCREENING: Status: RESOLVED | Noted: 2024-05-17 | Resolved: 2024-10-21

## 2024-10-22 ENCOUNTER — NON-APPOINTMENT (OUTPATIENT)
Age: 54
End: 2024-10-22

## 2024-10-22 ENCOUNTER — APPOINTMENT (OUTPATIENT)
Dept: HEART AND VASCULAR | Facility: CLINIC | Age: 54
End: 2024-10-22
Payer: COMMERCIAL

## 2024-10-22 VITALS
OXYGEN SATURATION: 99 % | HEIGHT: 68 IN | SYSTOLIC BLOOD PRESSURE: 135 MMHG | BODY MASS INDEX: 20.46 KG/M2 | DIASTOLIC BLOOD PRESSURE: 86 MMHG | WEIGHT: 135 LBS | HEART RATE: 72 BPM

## 2024-10-22 VITALS — SYSTOLIC BLOOD PRESSURE: 131 MMHG | DIASTOLIC BLOOD PRESSURE: 76 MMHG

## 2024-10-22 DIAGNOSIS — R03.0 ELEVATED BLOOD-PRESSURE READING, W/OUT DIAGNOSIS OF HYPERTENSION: ICD-10-CM

## 2024-10-22 DIAGNOSIS — R42 DIZZINESS AND GIDDINESS: ICD-10-CM

## 2024-10-22 DIAGNOSIS — Z13.1 ENCOUNTER FOR SCREENING FOR DIABETES MELLITUS: ICD-10-CM

## 2024-10-22 DIAGNOSIS — E56.9 VITAMIN DEFICIENCY, UNSPECIFIED: ICD-10-CM

## 2024-10-22 DIAGNOSIS — E78.41 ELEVATED LIPOPROTEIN(A): ICD-10-CM

## 2024-10-22 PROCEDURE — G2211 COMPLEX E/M VISIT ADD ON: CPT | Mod: NC

## 2024-10-22 PROCEDURE — 99214 OFFICE O/P EST MOD 30 MIN: CPT

## 2024-10-22 PROCEDURE — 93000 ELECTROCARDIOGRAM COMPLETE: CPT

## 2024-11-11 ENCOUNTER — NON-APPOINTMENT (OUTPATIENT)
Age: 54
End: 2024-11-11

## 2024-11-15 RX ORDER — LEVALBUTEROL TARTRATE 45 UG/1
45 AEROSOL, METERED ORAL
Qty: 3 | Refills: 3 | Status: ACTIVE | COMMUNITY
Start: 2024-11-15 | End: 1900-01-01

## 2024-11-21 ENCOUNTER — RESULT REVIEW (OUTPATIENT)
Age: 54
End: 2024-11-21

## 2024-12-19 ENCOUNTER — LABORATORY RESULT (OUTPATIENT)
Age: 54
End: 2024-12-19

## 2024-12-19 ENCOUNTER — NON-APPOINTMENT (OUTPATIENT)
Age: 54
End: 2024-12-19

## 2024-12-19 ENCOUNTER — APPOINTMENT (OUTPATIENT)
Dept: PULMONOLOGY | Facility: CLINIC | Age: 54
End: 2024-12-19

## 2024-12-19 VITALS
TEMPERATURE: 98.3 F | BODY MASS INDEX: 21.16 KG/M2 | DIASTOLIC BLOOD PRESSURE: 92 MMHG | OXYGEN SATURATION: 100 % | HEIGHT: 68 IN | SYSTOLIC BLOOD PRESSURE: 138 MMHG | HEART RATE: 70 BPM | RESPIRATION RATE: 16 BRPM | WEIGHT: 139.6 LBS

## 2024-12-19 DIAGNOSIS — G61.81 CHRONIC INFLAMMATORY DEMYELINATING POLYNEURITIS: ICD-10-CM

## 2024-12-19 DIAGNOSIS — E56.9 VITAMIN DEFICIENCY, UNSPECIFIED: ICD-10-CM

## 2024-12-19 DIAGNOSIS — E84.9 CYSTIC FIBROSIS, UNSPECIFIED: ICD-10-CM

## 2024-12-19 DIAGNOSIS — R04.2 HEMOPTYSIS: ICD-10-CM

## 2024-12-19 DIAGNOSIS — E55.9 VITAMIN D DEFICIENCY, UNSPECIFIED: ICD-10-CM

## 2024-12-19 DIAGNOSIS — F41.9 ANXIETY DISORDER, UNSPECIFIED: ICD-10-CM

## 2024-12-19 DIAGNOSIS — M85.859 OTHER SPECIFIED DISORDERS OF BONE DENSITY AND STRUCTURE, UNSPECIFIED THIGH: ICD-10-CM

## 2024-12-19 DIAGNOSIS — J45.909 UNSPECIFIED ASTHMA, UNCOMPLICATED: ICD-10-CM

## 2024-12-19 PROCEDURE — 94060 EVALUATION OF WHEEZING: CPT

## 2024-12-19 PROCEDURE — 94729 DIFFUSING CAPACITY: CPT

## 2024-12-19 PROCEDURE — 94726 PLETHYSMOGRAPHY LUNG VOLUMES: CPT

## 2024-12-19 PROCEDURE — 99215 OFFICE O/P EST HI 40 MIN: CPT | Mod: 25

## 2024-12-20 LAB
ALBUMIN SERPL ELPH-MCNC: 4.7 G/DL
ALP BLD-CCNC: 27 U/L
ALT SERPL-CCNC: 36 U/L
APTT BLD: 36.8 SEC
AST SERPL-CCNC: 49 U/L
BASOPHILS # BLD AUTO: 0.01 K/UL
BASOPHILS NFR BLD AUTO: 0.4 %
BILIRUB DIRECT SERPL-MCNC: 0.1 MG/DL
BILIRUB INDIRECT SERPL-MCNC: 0.4 MG/DL
BILIRUB SERPL-MCNC: 0.5 MG/DL
EOSINOPHIL # BLD AUTO: 0.04 K/UL
EOSINOPHIL NFR BLD AUTO: 1.4 %
GGT SERPL-CCNC: 9 U/L
HCT VFR BLD CALC: 46.3 %
HGB BLD-MCNC: 15.6 G/DL
IMM GRANULOCYTES NFR BLD AUTO: 0 %
INR PPP: 0.91 RATIO
LYMPHOCYTES # BLD AUTO: 0.9 K/UL
LYMPHOCYTES NFR BLD AUTO: 31.9 %
MAN DIFF?: NORMAL
MCHC RBC-ENTMCNC: 30.6 PG
MCHC RBC-ENTMCNC: 33.7 G/DL
MCV RBC AUTO: 91 FL
MONOCYTES # BLD AUTO: 0.19 K/UL
MONOCYTES NFR BLD AUTO: 6.7 %
NEUTROPHILS # BLD AUTO: 1.68 K/UL
NEUTROPHILS NFR BLD AUTO: 59.6 %
PLATELET # BLD AUTO: 209 K/UL
PROT SERPL-MCNC: 8.1 G/DL
PT BLD: 10.8 SEC
RBC # BLD: 5.09 M/UL
RBC # FLD: 14 %
WBC # FLD AUTO: 2.82 K/UL

## 2024-12-22 LAB — TOTAL IGE SMQN RAST: 76 KU/L

## 2024-12-26 LAB
BACTERIA SPT CF RESP CULT: NORMAL
VIT A SERPL-MCNC: 49.4 UG/DL

## 2024-12-27 ENCOUNTER — APPOINTMENT (OUTPATIENT)
Dept: PULMONOLOGY | Facility: CLINIC | Age: 54
End: 2024-12-27

## 2024-12-28 LAB
FUNGUS SPT CULT: NORMAL
RHODAMINE-AURAMINE STN SPEC: NORMAL

## 2024-12-29 LAB
A-TOCOPHEROL VIT E SERPL-MCNC: 14.5 MG/L
BETA+GAMMA TOCOPHEROL SERPL-MCNC: 0.3 MG/L

## 2025-02-27 ENCOUNTER — NON-APPOINTMENT (OUTPATIENT)
Age: 55
End: 2025-02-27

## 2025-02-27 ENCOUNTER — APPOINTMENT (OUTPATIENT)
Dept: PULMONOLOGY | Facility: CLINIC | Age: 55
End: 2025-02-27
Payer: COMMERCIAL

## 2025-02-27 VITALS — HEIGHT: 68 IN | WEIGHT: 138 LBS | BODY MASS INDEX: 20.92 KG/M2 | RESPIRATION RATE: 18 BRPM

## 2025-02-27 DIAGNOSIS — J45.909 UNSPECIFIED ASTHMA, UNCOMPLICATED: ICD-10-CM

## 2025-02-27 DIAGNOSIS — E84.9 CYSTIC FIBROSIS, UNSPECIFIED: ICD-10-CM

## 2025-02-27 DIAGNOSIS — M85.859 OTHER SPECIFIED DISORDERS OF BONE DENSITY AND STRUCTURE, UNSPECIFIED THIGH: ICD-10-CM

## 2025-02-27 PROCEDURE — 99215 OFFICE O/P EST HI 40 MIN: CPT | Mod: 95,25

## 2025-02-27 PROCEDURE — 94664 DEMO&/EVAL PT USE INHALER: CPT | Mod: 95

## 2025-04-17 ENCOUNTER — RX RENEWAL (OUTPATIENT)
Age: 55
End: 2025-04-17

## 2025-04-20 PROBLEM — E56.9 VITAMIN DEFICIENCY: Status: RESOLVED | Noted: 2024-05-17 | Resolved: 2025-04-20

## 2025-04-22 ENCOUNTER — APPOINTMENT (OUTPATIENT)
Dept: HEART AND VASCULAR | Facility: CLINIC | Age: 55
End: 2025-04-22
Payer: COMMERCIAL

## 2025-04-22 VITALS
HEART RATE: 68 BPM | SYSTOLIC BLOOD PRESSURE: 142 MMHG | WEIGHT: 138 LBS | HEIGHT: 68 IN | BODY MASS INDEX: 20.92 KG/M2 | DIASTOLIC BLOOD PRESSURE: 91 MMHG | OXYGEN SATURATION: 99 % | TEMPERATURE: 98.1 F

## 2025-04-22 DIAGNOSIS — E78.41 ELEVATED LIPOPROTEIN(A): ICD-10-CM

## 2025-04-22 DIAGNOSIS — E78.5 HYPERLIPIDEMIA, UNSPECIFIED: ICD-10-CM

## 2025-04-22 DIAGNOSIS — R03.0 ELEVATED BLOOD-PRESSURE READING, W/OUT DIAGNOSIS OF HYPERTENSION: ICD-10-CM

## 2025-04-22 DIAGNOSIS — R42 DIZZINESS AND GIDDINESS: ICD-10-CM

## 2025-04-22 PROCEDURE — 99214 OFFICE O/P EST MOD 30 MIN: CPT | Mod: 25

## 2025-04-22 PROCEDURE — 93880 EXTRACRANIAL BILAT STUDY: CPT | Mod: 59

## 2025-04-22 PROCEDURE — 93306 TTE W/DOPPLER COMPLETE: CPT | Mod: 59

## 2025-05-02 ENCOUNTER — APPOINTMENT (OUTPATIENT)
Dept: ENDOCRINOLOGY | Facility: CLINIC | Age: 55
End: 2025-05-02

## 2025-05-12 ENCOUNTER — RX RENEWAL (OUTPATIENT)
Age: 55
End: 2025-05-12

## 2025-05-20 ENCOUNTER — RX RENEWAL (OUTPATIENT)
Age: 55
End: 2025-05-20

## 2025-05-20 ENCOUNTER — APPOINTMENT (OUTPATIENT)
Dept: PULMONOLOGY | Facility: CLINIC | Age: 55
End: 2025-05-20

## 2025-05-29 RX ORDER — LEVOTHYROXINE SODIUM 0.15 MG/1
150 TABLET ORAL
Qty: 78 | Refills: 0 | Status: ACTIVE | COMMUNITY
Start: 2025-05-29 | End: 1900-01-01

## 2025-06-05 ENCOUNTER — APPOINTMENT (OUTPATIENT)
Dept: PULMONOLOGY | Facility: CLINIC | Age: 55
End: 2025-06-05
Payer: COMMERCIAL

## 2025-06-05 VITALS — BODY MASS INDEX: 20.92 KG/M2 | HEIGHT: 68 IN | WEIGHT: 138 LBS | RESPIRATION RATE: 18 BRPM

## 2025-06-05 DIAGNOSIS — J45.909 UNSPECIFIED ASTHMA, UNCOMPLICATED: ICD-10-CM

## 2025-06-05 DIAGNOSIS — M85.859 OTHER SPECIFIED DISORDERS OF BONE DENSITY AND STRUCTURE, UNSPECIFIED THIGH: ICD-10-CM

## 2025-06-05 DIAGNOSIS — E84.9 CYSTIC FIBROSIS, UNSPECIFIED: ICD-10-CM

## 2025-06-05 DIAGNOSIS — K21.9 GASTRO-ESOPHAGEAL REFLUX DISEASE W/OUT ESOPHAGITIS: ICD-10-CM

## 2025-06-05 PROCEDURE — 99215 OFFICE O/P EST HI 40 MIN: CPT | Mod: 95

## 2025-06-23 ENCOUNTER — NON-APPOINTMENT (OUTPATIENT)
Age: 55
End: 2025-06-23

## 2025-07-10 ENCOUNTER — NON-APPOINTMENT (OUTPATIENT)
Age: 55
End: 2025-07-10

## 2025-07-10 PROBLEM — J98.01 BRONCHOSPASM, ACUTE: Status: ACTIVE | Noted: 2025-07-10

## 2025-07-10 RX ORDER — METHYLPREDNISOLONE 4 MG/1
4 TABLET ORAL
Qty: 1 | Refills: 1 | Status: ACTIVE | COMMUNITY
Start: 2025-07-10 | End: 1900-01-01

## 2025-08-11 ENCOUNTER — RX RENEWAL (OUTPATIENT)
Age: 55
End: 2025-08-11

## 2025-08-22 ENCOUNTER — APPOINTMENT (OUTPATIENT)
Dept: ENDOCRINOLOGY | Facility: CLINIC | Age: 55
End: 2025-08-22

## 2025-08-22 VITALS
HEART RATE: 78 BPM | TEMPERATURE: 97.8 F | OXYGEN SATURATION: 98 % | BODY MASS INDEX: 21.82 KG/M2 | HEIGHT: 68 IN | WEIGHT: 144 LBS | RESPIRATION RATE: 16 BRPM | DIASTOLIC BLOOD PRESSURE: 76 MMHG | SYSTOLIC BLOOD PRESSURE: 125 MMHG

## 2025-08-22 PROCEDURE — G2211 COMPLEX E/M VISIT ADD ON: CPT | Mod: NC

## 2025-08-22 PROCEDURE — 99214 OFFICE O/P EST MOD 30 MIN: CPT

## 2025-09-03 ENCOUNTER — NON-APPOINTMENT (OUTPATIENT)
Age: 55
End: 2025-09-03

## 2025-09-11 ENCOUNTER — APPOINTMENT (OUTPATIENT)
Dept: RADIOLOGY | Facility: HOSPITAL | Age: 55
End: 2025-09-11
Payer: COMMERCIAL

## 2025-09-11 PROCEDURE — 77080 DXA BONE DENSITY AXIAL: CPT | Mod: 26
